# Patient Record
Sex: MALE | Race: WHITE | NOT HISPANIC OR LATINO | Employment: UNEMPLOYED | URBAN - METROPOLITAN AREA
[De-identification: names, ages, dates, MRNs, and addresses within clinical notes are randomized per-mention and may not be internally consistent; named-entity substitution may affect disease eponyms.]

---

## 2019-03-20 ENCOUNTER — HOSPITAL ENCOUNTER (OUTPATIENT)
Dept: RADIOLOGY | Facility: MEDICAL CENTER | Age: 54
End: 2019-03-20

## 2019-03-20 ENCOUNTER — APPOINTMENT (OUTPATIENT)
Dept: RADIOLOGY | Facility: MEDICAL CENTER | Age: 54
DRG: 280 | End: 2019-03-20
Attending: EMERGENCY MEDICINE
Payer: COMMERCIAL

## 2019-03-20 ENCOUNTER — HOSPITAL ENCOUNTER (INPATIENT)
Facility: MEDICAL CENTER | Age: 54
LOS: 6 days | DRG: 280 | End: 2019-03-26
Attending: EMERGENCY MEDICINE | Admitting: HOSPITALIST
Payer: COMMERCIAL

## 2019-03-20 DIAGNOSIS — I21.4 NSTEMI (NON-ST ELEVATED MYOCARDIAL INFARCTION) (HCC): ICD-10-CM

## 2019-03-20 PROBLEM — E11.65 TYPE 2 DIABETES MELLITUS WITH HYPERGLYCEMIA, WITHOUT LONG-TERM CURRENT USE OF INSULIN (HCC): Status: ACTIVE | Noted: 2019-03-20

## 2019-03-20 PROBLEM — F15.10 AMPHETAMINE ABUSE (HCC): Status: ACTIVE | Noted: 2019-03-20

## 2019-03-20 PROBLEM — E66.01 CLASS 3 SEVERE OBESITY DUE TO EXCESS CALORIES WITHOUT SERIOUS COMORBIDITY WITH BODY MASS INDEX (BMI) OF 40.0 TO 44.9 IN ADULT (HCC): Status: ACTIVE | Noted: 2019-03-20

## 2019-03-20 PROBLEM — E78.5 DYSLIPIDEMIA: Status: ACTIVE | Noted: 2019-03-20

## 2019-03-20 PROBLEM — I50.9 CHF (CONGESTIVE HEART FAILURE) (HCC): Status: ACTIVE | Noted: 2019-03-20

## 2019-03-20 PROBLEM — D64.9 NORMOCYTIC ANEMIA: Status: ACTIVE | Noted: 2019-03-20

## 2019-03-20 LAB
ALBUMIN SERPL BCP-MCNC: 2.9 G/DL (ref 3.2–4.9)
ALBUMIN/GLOB SERPL: 1 G/DL
ALP SERPL-CCNC: 74 U/L (ref 30–99)
ALT SERPL-CCNC: 19 U/L (ref 2–50)
ANION GAP SERPL CALC-SCNC: 8 MMOL/L (ref 0–11.9)
APTT PPP: 46.1 SEC (ref 24.7–36)
AST SERPL-CCNC: 25 U/L (ref 12–45)
BASOPHILS # BLD AUTO: 0.4 % (ref 0–1.8)
BASOPHILS # BLD: 0.06 K/UL (ref 0–0.12)
BILIRUB SERPL-MCNC: 0.6 MG/DL (ref 0.1–1.5)
BUN SERPL-MCNC: 22 MG/DL (ref 8–22)
CALCIUM SERPL-MCNC: 8.7 MG/DL (ref 8.5–10.5)
CHLORIDE SERPL-SCNC: 107 MMOL/L (ref 96–112)
CO2 SERPL-SCNC: 22 MMOL/L (ref 20–33)
CREAT SERPL-MCNC: 1.24 MG/DL (ref 0.5–1.4)
EKG IMPRESSION: NORMAL
EOSINOPHIL # BLD AUTO: 0.36 K/UL (ref 0–0.51)
EOSINOPHIL NFR BLD: 2.3 % (ref 0–6.9)
ERYTHROCYTE [DISTWIDTH] IN BLOOD BY AUTOMATED COUNT: 48.7 FL (ref 35.9–50)
GLOBULIN SER CALC-MCNC: 3 G/DL (ref 1.9–3.5)
GLUCOSE SERPL-MCNC: 169 MG/DL (ref 65–99)
HCT VFR BLD AUTO: 42.5 % (ref 42–52)
HGB BLD-MCNC: 13.8 G/DL (ref 14–18)
IMM GRANULOCYTES # BLD AUTO: 0.14 K/UL (ref 0–0.11)
IMM GRANULOCYTES NFR BLD AUTO: 0.9 % (ref 0–0.9)
INR PPP: 1.23 (ref 0.87–1.13)
LYMPHOCYTES # BLD AUTO: 2.1 K/UL (ref 1–4.8)
LYMPHOCYTES NFR BLD: 13.2 % (ref 22–41)
MCH RBC QN AUTO: 29.2 PG (ref 27–33)
MCHC RBC AUTO-ENTMCNC: 32.5 G/DL (ref 33.7–35.3)
MCV RBC AUTO: 89.9 FL (ref 81.4–97.8)
MONOCYTES # BLD AUTO: 1.66 K/UL (ref 0–0.85)
MONOCYTES NFR BLD AUTO: 10.5 % (ref 0–13.4)
NEUTROPHILS # BLD AUTO: 11.55 K/UL (ref 1.82–7.42)
NEUTROPHILS NFR BLD: 72.7 % (ref 44–72)
NRBC # BLD AUTO: 0 K/UL
NRBC BLD-RTO: 0 /100 WBC
PLATELET # BLD AUTO: 279 K/UL (ref 164–446)
PMV BLD AUTO: 9.7 FL (ref 9–12.9)
POTASSIUM SERPL-SCNC: 4.3 MMOL/L (ref 3.6–5.5)
PROT SERPL-MCNC: 5.9 G/DL (ref 6–8.2)
PROTHROMBIN TIME: 15.6 SEC (ref 12–14.6)
RBC # BLD AUTO: 4.73 M/UL (ref 4.7–6.1)
SODIUM SERPL-SCNC: 137 MMOL/L (ref 135–145)
TROPONIN I SERPL-MCNC: 10.98 NG/ML (ref 0–0.04)
WBC # BLD AUTO: 15.9 K/UL (ref 4.8–10.8)

## 2019-03-20 PROCEDURE — 770020 HCHG ROOM/CARE - TELE (206)

## 2019-03-20 PROCEDURE — 85025 COMPLETE CBC W/AUTO DIFF WBC: CPT

## 2019-03-20 PROCEDURE — 99223 1ST HOSP IP/OBS HIGH 75: CPT | Performed by: HOSPITALIST

## 2019-03-20 PROCEDURE — 700111 HCHG RX REV CODE 636 W/ 250 OVERRIDE (IP): Performed by: HOSPITALIST

## 2019-03-20 PROCEDURE — 80053 COMPREHEN METABOLIC PANEL: CPT

## 2019-03-20 PROCEDURE — 700111 HCHG RX REV CODE 636 W/ 250 OVERRIDE (IP): Performed by: EMERGENCY MEDICINE

## 2019-03-20 PROCEDURE — 85730 THROMBOPLASTIN TIME PARTIAL: CPT

## 2019-03-20 PROCEDURE — 93005 ELECTROCARDIOGRAM TRACING: CPT | Performed by: EMERGENCY MEDICINE

## 2019-03-20 PROCEDURE — 71045 X-RAY EXAM CHEST 1 VIEW: CPT

## 2019-03-20 PROCEDURE — 96366 THER/PROPH/DIAG IV INF ADDON: CPT

## 2019-03-20 PROCEDURE — 85610 PROTHROMBIN TIME: CPT

## 2019-03-20 PROCEDURE — 99255 IP/OBS CONSLTJ NEW/EST HI 80: CPT | Performed by: INTERNAL MEDICINE

## 2019-03-20 PROCEDURE — 94760 N-INVAS EAR/PLS OXIMETRY 1: CPT

## 2019-03-20 PROCEDURE — 96365 THER/PROPH/DIAG IV INF INIT: CPT

## 2019-03-20 PROCEDURE — 99285 EMERGENCY DEPT VISIT HI MDM: CPT

## 2019-03-20 PROCEDURE — 84484 ASSAY OF TROPONIN QUANT: CPT

## 2019-03-20 PROCEDURE — 304561 HCHG STAT O2

## 2019-03-20 PROCEDURE — 96375 TX/PRO/DX INJ NEW DRUG ADDON: CPT

## 2019-03-20 RX ORDER — PROMETHAZINE HYDROCHLORIDE 25 MG/1
12.5-25 TABLET ORAL EVERY 4 HOURS PRN
Status: DISCONTINUED | OUTPATIENT
Start: 2019-03-20 | End: 2019-03-21

## 2019-03-20 RX ORDER — ATORVASTATIN CALCIUM 20 MG/1
80 TABLET, FILM COATED ORAL NIGHTLY
Status: DISCONTINUED | OUTPATIENT
Start: 2019-03-21 | End: 2019-03-20

## 2019-03-20 RX ORDER — ONDANSETRON 2 MG/ML
4 INJECTION INTRAMUSCULAR; INTRAVENOUS EVERY 4 HOURS PRN
Status: DISCONTINUED | OUTPATIENT
Start: 2019-03-20 | End: 2019-03-26 | Stop reason: HOSPADM

## 2019-03-20 RX ORDER — LOSARTAN POTASSIUM 25 MG/1
25 TABLET ORAL DAILY
Status: ON HOLD | COMMUNITY
End: 2019-03-25

## 2019-03-20 RX ORDER — MORPHINE SULFATE 4 MG/ML
2-4 INJECTION, SOLUTION INTRAMUSCULAR; INTRAVENOUS
Status: DISCONTINUED | OUTPATIENT
Start: 2019-03-20 | End: 2019-03-26 | Stop reason: HOSPADM

## 2019-03-20 RX ORDER — PROMETHAZINE HYDROCHLORIDE 25 MG/1
12.5-25 SUPPOSITORY RECTAL EVERY 4 HOURS PRN
Status: DISCONTINUED | OUTPATIENT
Start: 2019-03-20 | End: 2019-03-21

## 2019-03-20 RX ORDER — POLYETHYLENE GLYCOL 3350 17 G/17G
1 POWDER, FOR SOLUTION ORAL
Status: DISCONTINUED | OUTPATIENT
Start: 2019-03-20 | End: 2019-03-21

## 2019-03-20 RX ORDER — DIGOXIN 125 MCG
250 TABLET ORAL DAILY
Status: DISCONTINUED | OUTPATIENT
Start: 2019-03-21 | End: 2019-03-23

## 2019-03-20 RX ORDER — DEXTROSE MONOHYDRATE 25 G/50ML
25 INJECTION, SOLUTION INTRAVENOUS
Status: DISCONTINUED | OUTPATIENT
Start: 2019-03-20 | End: 2019-03-24

## 2019-03-20 RX ORDER — ATORVASTATIN CALCIUM 20 MG/1
40 TABLET, FILM COATED ORAL NIGHTLY
Status: ON HOLD | COMMUNITY
End: 2019-03-25

## 2019-03-20 RX ORDER — ASPIRIN 300 MG/1
300 SUPPOSITORY RECTAL DAILY
Status: DISCONTINUED | OUTPATIENT
Start: 2019-03-21 | End: 2019-03-23

## 2019-03-20 RX ORDER — FUROSEMIDE 10 MG/ML
40 INJECTION INTRAMUSCULAR; INTRAVENOUS ONCE
Status: COMPLETED | OUTPATIENT
Start: 2019-03-20 | End: 2019-03-20

## 2019-03-20 RX ORDER — HEPARIN SODIUM 1000 [USP'U]/ML
3800 INJECTION, SOLUTION INTRAVENOUS; SUBCUTANEOUS PRN
Status: DISCONTINUED | OUTPATIENT
Start: 2019-03-20 | End: 2019-03-22

## 2019-03-20 RX ORDER — ATORVASTATIN CALCIUM 80 MG/1
80 TABLET, FILM COATED ORAL EVERY EVENING
Status: DISCONTINUED | OUTPATIENT
Start: 2019-03-21 | End: 2019-03-26 | Stop reason: HOSPADM

## 2019-03-20 RX ORDER — ASPIRIN 81 MG/1
324 TABLET, CHEWABLE ORAL DAILY
Status: DISCONTINUED | OUTPATIENT
Start: 2019-03-21 | End: 2019-03-23

## 2019-03-20 RX ORDER — NITROGLYCERIN 0.4 MG/1
0.4 TABLET SUBLINGUAL
Status: DISCONTINUED | OUTPATIENT
Start: 2019-03-20 | End: 2019-03-26 | Stop reason: HOSPADM

## 2019-03-20 RX ORDER — OXYCODONE HYDROCHLORIDE 5 MG/1
5 TABLET ORAL
Status: DISCONTINUED | OUTPATIENT
Start: 2019-03-20 | End: 2019-03-21

## 2019-03-20 RX ORDER — OXYCODONE HYDROCHLORIDE 5 MG/1
2.5 TABLET ORAL
Status: DISCONTINUED | OUTPATIENT
Start: 2019-03-20 | End: 2019-03-21

## 2019-03-20 RX ORDER — CLONIDINE HYDROCHLORIDE 0.1 MG/1
0.1 TABLET ORAL EVERY 6 HOURS PRN
Status: DISCONTINUED | OUTPATIENT
Start: 2019-03-20 | End: 2019-03-21

## 2019-03-20 RX ORDER — BISACODYL 10 MG
10 SUPPOSITORY, RECTAL RECTAL
Status: DISCONTINUED | OUTPATIENT
Start: 2019-03-20 | End: 2019-03-21

## 2019-03-20 RX ORDER — FUROSEMIDE 10 MG/ML
20 INJECTION INTRAMUSCULAR; INTRAVENOUS
Status: DISCONTINUED | OUTPATIENT
Start: 2019-03-21 | End: 2019-03-21

## 2019-03-20 RX ORDER — ONDANSETRON 4 MG/1
4 TABLET, ORALLY DISINTEGRATING ORAL EVERY 4 HOURS PRN
Status: DISCONTINUED | OUTPATIENT
Start: 2019-03-20 | End: 2019-03-26 | Stop reason: HOSPADM

## 2019-03-20 RX ORDER — MORPHINE SULFATE 4 MG/ML
2 INJECTION, SOLUTION INTRAMUSCULAR; INTRAVENOUS
Status: DISCONTINUED | OUTPATIENT
Start: 2019-03-20 | End: 2019-03-21

## 2019-03-20 RX ORDER — ASPIRIN 325 MG
325 TABLET ORAL DAILY
Status: DISCONTINUED | OUTPATIENT
Start: 2019-03-21 | End: 2019-03-23

## 2019-03-20 RX ORDER — LOSARTAN POTASSIUM 25 MG/1
25 TABLET ORAL DAILY
Status: DISCONTINUED | OUTPATIENT
Start: 2019-03-21 | End: 2019-03-22

## 2019-03-20 RX ORDER — AMOXICILLIN 250 MG
2 CAPSULE ORAL 2 TIMES DAILY
Status: DISCONTINUED | OUTPATIENT
Start: 2019-03-21 | End: 2019-03-21

## 2019-03-20 RX ORDER — FUROSEMIDE 20 MG/1
20 TABLET ORAL 2 TIMES DAILY
Status: ON HOLD | COMMUNITY
End: 2019-03-25

## 2019-03-20 RX ORDER — ACETAMINOPHEN 325 MG/1
650 TABLET ORAL EVERY 6 HOURS PRN
Status: DISCONTINUED | OUTPATIENT
Start: 2019-03-20 | End: 2019-03-26 | Stop reason: HOSPADM

## 2019-03-20 RX ADMIN — HEPARIN SODIUM 1450 UNITS/HR: 5000 INJECTION, SOLUTION INTRAVENOUS at 22:36

## 2019-03-20 RX ADMIN — FUROSEMIDE 40 MG: 10 INJECTION, SOLUTION INTRAMUSCULAR; INTRAVENOUS at 23:26

## 2019-03-20 RX ADMIN — HEPARIN SODIUM 3800 UNITS: 1000 INJECTION, SOLUTION INTRAVENOUS; SUBCUTANEOUS at 23:24

## 2019-03-20 ASSESSMENT — ENCOUNTER SYMPTOMS
MUSCULOSKELETAL NEGATIVE: 1
NEUROLOGICAL NEGATIVE: 1
EYES NEGATIVE: 1
RESPIRATORY NEGATIVE: 1
GASTROINTESTINAL NEGATIVE: 1
PSYCHIATRIC NEGATIVE: 1
CONSTITUTIONAL NEGATIVE: 1

## 2019-03-21 ENCOUNTER — APPOINTMENT (OUTPATIENT)
Dept: CARDIOLOGY | Facility: MEDICAL CENTER | Age: 54
DRG: 280 | End: 2019-03-21
Attending: HOSPITALIST
Payer: COMMERCIAL

## 2019-03-21 PROBLEM — I10 ESSENTIAL HYPERTENSION: Status: ACTIVE | Noted: 2019-03-21

## 2019-03-21 PROBLEM — D72.825 BANDEMIA: Status: ACTIVE | Noted: 2019-03-21

## 2019-03-21 PROBLEM — R05.9 COUGH: Status: ACTIVE | Noted: 2019-03-21

## 2019-03-21 LAB
AMPHET UR QL SCN: POSITIVE
ANION GAP SERPL CALC-SCNC: 5 MMOL/L (ref 0–11.9)
APTT PPP: 106 SEC (ref 24.7–36)
APTT PPP: 78.3 SEC (ref 24.7–36)
BARBITURATES UR QL SCN: NEGATIVE
BASOPHILS # BLD AUTO: 0.3 % (ref 0–1.8)
BASOPHILS # BLD: 0.05 K/UL (ref 0–0.12)
BENZODIAZ UR QL SCN: NEGATIVE
BUN SERPL-MCNC: 22 MG/DL (ref 8–22)
BZE UR QL SCN: NEGATIVE
CALCIUM SERPL-MCNC: 8.7 MG/DL (ref 8.5–10.5)
CANNABINOIDS UR QL SCN: NEGATIVE
CHLORIDE SERPL-SCNC: 101 MMOL/L (ref 96–112)
CO2 SERPL-SCNC: 27 MMOL/L (ref 20–33)
CREAT SERPL-MCNC: 1.34 MG/DL (ref 0.5–1.4)
CRP SERPL HS-MCNC: 8.51 MG/DL (ref 0–0.75)
EOSINOPHIL # BLD AUTO: 0.32 K/UL (ref 0–0.51)
EOSINOPHIL NFR BLD: 2.1 % (ref 0–6.9)
ERYTHROCYTE [DISTWIDTH] IN BLOOD BY AUTOMATED COUNT: 48.8 FL (ref 35.9–50)
ERYTHROCYTE [SEDIMENTATION RATE] IN BLOOD BY WESTERGREN METHOD: 46 MM/HOUR (ref 0–20)
GLUCOSE BLD-MCNC: 168 MG/DL (ref 65–99)
GLUCOSE BLD-MCNC: 190 MG/DL (ref 65–99)
GLUCOSE BLD-MCNC: 206 MG/DL (ref 65–99)
GLUCOSE BLD-MCNC: 222 MG/DL (ref 65–99)
GLUCOSE SERPL-MCNC: 254 MG/DL (ref 65–99)
HCT VFR BLD AUTO: 41 % (ref 42–52)
HGB BLD-MCNC: 13.1 G/DL (ref 14–18)
IMM GRANULOCYTES # BLD AUTO: 0.14 K/UL (ref 0–0.11)
IMM GRANULOCYTES NFR BLD AUTO: 0.9 % (ref 0–0.9)
LYMPHOCYTES # BLD AUTO: 1.99 K/UL (ref 1–4.8)
LYMPHOCYTES NFR BLD: 13.3 % (ref 22–41)
MCH RBC QN AUTO: 28.9 PG (ref 27–33)
MCHC RBC AUTO-ENTMCNC: 32 G/DL (ref 33.7–35.3)
MCV RBC AUTO: 90.5 FL (ref 81.4–97.8)
METHADONE UR QL SCN: NEGATIVE
MONOCYTES # BLD AUTO: 1.39 K/UL (ref 0–0.85)
MONOCYTES NFR BLD AUTO: 9.3 % (ref 0–13.4)
NEUTROPHILS # BLD AUTO: 11.06 K/UL (ref 1.82–7.42)
NEUTROPHILS NFR BLD: 74.1 % (ref 44–72)
NRBC # BLD AUTO: 0 K/UL
NRBC BLD-RTO: 0 /100 WBC
OPIATES UR QL SCN: POSITIVE
OXYCODONE UR QL SCN: NEGATIVE
PCP UR QL SCN: NEGATIVE
PLATELET # BLD AUTO: 244 K/UL (ref 164–446)
PMV BLD AUTO: 9.7 FL (ref 9–12.9)
POTASSIUM SERPL-SCNC: 4.7 MMOL/L (ref 3.6–5.5)
PROCALCITONIN SERPL-MCNC: <0.05 NG/ML
PROPOXYPH UR QL SCN: NEGATIVE
RBC # BLD AUTO: 4.53 M/UL (ref 4.7–6.1)
SODIUM SERPL-SCNC: 133 MMOL/L (ref 135–145)
TROPONIN I SERPL-MCNC: 10.18 NG/ML (ref 0–0.04)
WBC # BLD AUTO: 15 K/UL (ref 4.8–10.8)

## 2019-03-21 PROCEDURE — 83036 HEMOGLOBIN GLYCOSYLATED A1C: CPT

## 2019-03-21 PROCEDURE — 84484 ASSAY OF TROPONIN QUANT: CPT

## 2019-03-21 PROCEDURE — 700102 HCHG RX REV CODE 250 W/ 637 OVERRIDE(OP): Performed by: INTERNAL MEDICINE

## 2019-03-21 PROCEDURE — A9270 NON-COVERED ITEM OR SERVICE: HCPCS | Performed by: INTERNAL MEDICINE

## 2019-03-21 PROCEDURE — 700111 HCHG RX REV CODE 636 W/ 250 OVERRIDE (IP): Performed by: INTERNAL MEDICINE

## 2019-03-21 PROCEDURE — 85730 THROMBOPLASTIN TIME PARTIAL: CPT

## 2019-03-21 PROCEDURE — 82962 GLUCOSE BLOOD TEST: CPT | Mod: 91

## 2019-03-21 PROCEDURE — 700105 HCHG RX REV CODE 258

## 2019-03-21 PROCEDURE — 85652 RBC SED RATE AUTOMATED: CPT

## 2019-03-21 PROCEDURE — 700111 HCHG RX REV CODE 636 W/ 250 OVERRIDE (IP): Performed by: STUDENT IN AN ORGANIZED HEALTH CARE EDUCATION/TRAINING PROGRAM

## 2019-03-21 PROCEDURE — 700102 HCHG RX REV CODE 250 W/ 637 OVERRIDE(OP): Performed by: HOSPITALIST

## 2019-03-21 PROCEDURE — 700105 HCHG RX REV CODE 258: Performed by: INTERNAL MEDICINE

## 2019-03-21 PROCEDURE — 80307 DRUG TEST PRSMV CHEM ANLYZR: CPT

## 2019-03-21 PROCEDURE — 700111 HCHG RX REV CODE 636 W/ 250 OVERRIDE (IP): Performed by: HOSPITALIST

## 2019-03-21 PROCEDURE — 770020 HCHG ROOM/CARE - TELE (206)

## 2019-03-21 PROCEDURE — 80048 BASIC METABOLIC PNL TOTAL CA: CPT

## 2019-03-21 PROCEDURE — 87040 BLOOD CULTURE FOR BACTERIA: CPT

## 2019-03-21 PROCEDURE — 84145 PROCALCITONIN (PCT): CPT

## 2019-03-21 PROCEDURE — 36415 COLL VENOUS BLD VENIPUNCTURE: CPT

## 2019-03-21 PROCEDURE — 85025 COMPLETE CBC W/AUTO DIFF WBC: CPT

## 2019-03-21 PROCEDURE — 99232 SBSQ HOSP IP/OBS MODERATE 35: CPT | Mod: GC | Performed by: INTERNAL MEDICINE

## 2019-03-21 PROCEDURE — 86140 C-REACTIVE PROTEIN: CPT

## 2019-03-21 PROCEDURE — 99233 SBSQ HOSP IP/OBS HIGH 50: CPT | Performed by: INTERNAL MEDICINE

## 2019-03-21 PROCEDURE — A9270 NON-COVERED ITEM OR SERVICE: HCPCS | Performed by: HOSPITALIST

## 2019-03-21 RX ORDER — SODIUM CHLORIDE 9 MG/ML
INJECTION, SOLUTION INTRAVENOUS
Status: COMPLETED
Start: 2019-03-21 | End: 2019-03-21

## 2019-03-21 RX ORDER — FUROSEMIDE 10 MG/ML
40 INJECTION INTRAMUSCULAR; INTRAVENOUS
Status: DISCONTINUED | OUTPATIENT
Start: 2019-03-21 | End: 2019-03-25

## 2019-03-21 RX ORDER — POLYETHYLENE GLYCOL 3350 17 G/17G
1 POWDER, FOR SOLUTION ORAL 2 TIMES DAILY
Status: DISCONTINUED | OUTPATIENT
Start: 2019-03-21 | End: 2019-03-26 | Stop reason: HOSPADM

## 2019-03-21 RX ORDER — DOXYCYCLINE 100 MG/1
100 TABLET ORAL EVERY 12 HOURS
Status: DISCONTINUED | OUTPATIENT
Start: 2019-03-21 | End: 2019-03-22

## 2019-03-21 RX ADMIN — DOXYCYCLINE 100 MG: 100 TABLET, FILM COATED ORAL at 13:18

## 2019-03-21 RX ADMIN — HEPARIN SODIUM 1750 UNITS/HR: 5000 INJECTION, SOLUTION INTRAVENOUS at 13:14

## 2019-03-21 RX ADMIN — POLYETHYLENE GLYCOL 3350 1 PACKET: 17 POWDER, FOR SOLUTION ORAL at 17:44

## 2019-03-21 RX ADMIN — DIGOXIN 250 MCG: 125 TABLET ORAL at 00:53

## 2019-03-21 RX ADMIN — INSULIN HUMAN 2 UNITS: 100 INJECTION, SOLUTION PARENTERAL at 01:04

## 2019-03-21 RX ADMIN — AMPICILLIN SODIUM AND SULBACTAM SODIUM 3 G: 2; 1 INJECTION, POWDER, FOR SOLUTION INTRAMUSCULAR; INTRAVENOUS at 13:25

## 2019-03-21 RX ADMIN — CLONIDINE HYDROCHLORIDE 0.1 MG: 0.1 TABLET ORAL at 04:47

## 2019-03-21 RX ADMIN — ATORVASTATIN CALCIUM 80 MG: 80 TABLET, FILM COATED ORAL at 17:43

## 2019-03-21 RX ADMIN — LOSARTAN POTASSIUM 25 MG: 25 TABLET ORAL at 04:47

## 2019-03-21 RX ADMIN — INSULIN HUMAN 2 UNITS: 100 INJECTION, SOLUTION PARENTERAL at 06:12

## 2019-03-21 RX ADMIN — INSULIN HUMAN 1 UNITS: 100 INJECTION, SOLUTION PARENTERAL at 11:04

## 2019-03-21 RX ADMIN — INSULIN HUMAN 1 UNITS: 100 INJECTION, SOLUTION PARENTERAL at 17:00

## 2019-03-21 RX ADMIN — DIGOXIN 250 MCG: 125 TABLET ORAL at 17:43

## 2019-03-21 RX ADMIN — ASPIRIN 325 MG: 325 TABLET ORAL at 04:47

## 2019-03-21 RX ADMIN — AMPICILLIN SODIUM AND SULBACTAM SODIUM 3 G: 2; 1 INJECTION, POWDER, FOR SOLUTION INTRAMUSCULAR; INTRAVENOUS at 17:44

## 2019-03-21 RX ADMIN — FUROSEMIDE 20 MG: 10 INJECTION, SOLUTION INTRAMUSCULAR; INTRAVENOUS at 04:49

## 2019-03-21 RX ADMIN — SODIUM CHLORIDE 500 ML: 9 INJECTION, SOLUTION INTRAVENOUS at 13:19

## 2019-03-21 RX ADMIN — FUROSEMIDE 40 MG: 10 INJECTION, SOLUTION INTRAMUSCULAR; INTRAVENOUS at 16:15

## 2019-03-21 ASSESSMENT — ENCOUNTER SYMPTOMS
HEMOPTYSIS: 0
HEARTBURN: 0
WEAKNESS: 1
DOUBLE VISION: 0
FEVER: 0
MYALGIAS: 0
NECK PAIN: 0
ORTHOPNEA: 1
CONSTIPATION: 0
SPUTUM PRODUCTION: 0
SPEECH CHANGE: 0
SEIZURES: 0
DEPRESSION: 0
PND: 1
COUGH: 1
DIZZINESS: 0
FLANK PAIN: 0
FALLS: 0
BLOOD IN STOOL: 0
LOSS OF CONSCIOUSNESS: 0
HEADACHES: 0
CHILLS: 0
NAUSEA: 0
TINGLING: 0
VOMITING: 0
FOCAL WEAKNESS: 0
SHORTNESS OF BREATH: 1
DIARRHEA: 0
BLURRED VISION: 0
SENSORY CHANGE: 0
TREMORS: 0
CLAUDICATION: 0
BACK PAIN: 0
PALPITATIONS: 0
ABDOMINAL PAIN: 0
DIAPHORESIS: 0

## 2019-03-21 ASSESSMENT — LIFESTYLE VARIABLES
SUBSTANCE_ABUSE: 1
ALCOHOL_USE: NO

## 2019-03-21 ASSESSMENT — COGNITIVE AND FUNCTIONAL STATUS - GENERAL
MOBILITY SCORE: 22
SUGGESTED CMS G CODE MODIFIER MOBILITY: CJ
SUGGESTED CMS G CODE MODIFIER DAILY ACTIVITY: CH
DAILY ACTIVITIY SCORE: 24
CLIMB 3 TO 5 STEPS WITH RAILING: A LITTLE
WALKING IN HOSPITAL ROOM: A LITTLE

## 2019-03-21 ASSESSMENT — PATIENT HEALTH QUESTIONNAIRE - PHQ9
2. FEELING DOWN, DEPRESSED, IRRITABLE, OR HOPELESS: NOT AT ALL
1. LITTLE INTEREST OR PLEASURE IN DOING THINGS: NOT AT ALL
SUM OF ALL RESPONSES TO PHQ9 QUESTIONS 1 AND 2: 0

## 2019-03-21 NOTE — PROGRESS NOTES
[facility] Cardiology Follow-up Consult Note    Date of note:    3/21/2019      Consulting Physician: Stefani Helms M.D.      Chief Complaint   Patient presents with   • Chest Pain     Interfacility transfer from [facility] for NSTEMI       HPI:  52 yo male with PMHx of HTN, HLD, tobacco use presents with 1-2 weeks of worsening dyspnea, orthopnea, PND, central chest pressure and 30 lb weight gain. Admitted and found to have elevated trop, urine + for amphetamines and symptoms suggestive of acute heart failure. Patient was transferred to [facility] for further evaluation with cardiac catheterization. Due to respiratory status and volume overload he was felt to be unable to lie flat and thus tolerate cath. He was started on heparin drip with diuresis and plan for coronary angiogram once more stable.       Interim Events:  - symptoms improved with blood pressure control and diuresis, still feels SOB but better, currently chest pain free   - still with difficulty lying flat, plan for diuresis today and angiogram tomorrow, NPO at midnight   - echocardiogram pending       ROS  No NV, No Bleeding, No dizziness, + SOB, denies chest pain or palpitations, feels tired and fatigue, + weight gain   All other review of systems reviewed and negative.    Past medical, surgical, social, and family history reviewed and unchanged from admission except as noted in assessment and plan.    Medications: Reviewed in MAR  Current Facility-Administered Medications   Medication Dose Frequency Provider Last Rate Last Dose   • polyethylene glycol/lytes (MIRALAX) PACKET 1 Packet  1 Packet BID Stefani Helms M.D.       • ampicillin/sulbactam (UNASYN) 3 g in  mL IVPB  3 g Q6HRS Stefani Helms M.D. 200 mL/hr at 03/21/19 1325 3 g at 03/21/19 1325   • doxycycline monohydrate (ADOXA) tablet 100 mg  100 mg Q12HRS Stefani Helms M.D.   100 mg at 03/21/19 1318   • furosemide (LASIX) injection 40 mg  40 mg BID DIURETIC Magi Santos M.D.       • heparin injection  "3,800 Units  3,800 Units PRN Jean-Paul Dyson M.D.   3,800 Units at 03/20/19 2324    And   • heparin infusion 25,000 units in 500 ml 0.45% nacl   Continuous Jean-Paul Dyson M.D. 35 mL/hr at 03/21/19 1346 1,750 Units/hr at 03/21/19 1346   • acetaminophen (TYLENOL) tablet 650 mg  650 mg Q6HRS PRN Jean-Paul Dyson M.D.       • ondansetron (ZOFRAN) syringe/vial injection 4 mg  4 mg Q4HRS PRN Jean-Paul Dyson M.D.       • ondansetron (ZOFRAN ODT) dispertab 4 mg  4 mg Q4HRS PRN Jean-Paul Dyson M.D.       • morphine (pf) 4 mg/ml injection 2-4 mg  2-4 mg Q5 MIN PRN Jean-Paul Dyson M.D.       • nitroglycerin (NITROSTAT) tablet 0.4 mg  0.4 mg Q5 MIN PRN Jean-Paul Dyson M.D.       • atorvastatin (LIPITOR) tablet 80 mg  80 mg Q EVENING Jean-Paul Dyson M.D.       • aspirin (ASA) tablet 325 mg  325 mg DAILY Jean-Paul Dyson M.D.   325 mg at 03/21/19 0447    Or   • aspirin (ASA) chewable tab 324 mg  324 mg DAILY Jean-Paul Dyson M.D.        Or   • aspirin (ASA) suppository 300 mg  300 mg DAILY Jean-Paul Dyson M.D.       • losartan (COZAAR) tablet 25 mg  25 mg DAILY Jean-Paul Dyson M.D.   25 mg at 03/21/19 0447   • digoxin (LANOXIN) tablet 250 mcg  250 mcg DAILY AT 1800 Pantera Ritchie M.D.   250 mcg at 03/21/19 0053   • insulin regular (HUMULIN R) injection 1-6 Units  1-6 Units Q6HRS Jean-Paul Dyson M.D.   1 Units at 03/21/19 1104    And   • glucose 4 g chewable tablet 16 g  16 g Q15 MIN PRN Jean-Paul Dyson M.D.        And   • dextrose 50% (D50W) injection 25 mL  25 mL Q15 MIN PRN Jean-Paul Dyson M.D.         Last reviewed on 3/20/2019 11:07 PM by Sammie Smith PhT  No Known Allergies    Physical Exam  Body mass index is 40.83 kg/m². Blood pressure 146/95, pulse (!) 106, temperature 36.6 °C (97.9 °F), temperature source Temporal, resp. rate 18, height 1.727 m (5' 8\"), weight 121.8 kg (268 lb 8.3 oz), SpO2 97 %.   Vitals:    03/21/19 0024 03/21/19 0423 03/21/19 0915 03/21/19 1308   BP: 158/105 (!) 164/110 159/111 146/95   Pulse:  " (!) 104 99 (!) 106   Resp: (!) 22 20 20 18   Temp: 36.6 °C (97.8 °F) 36.3 °C (97.4 °F) 36.3 °C (97.3 °F) 36.6 °C (97.9 °F)   TempSrc: Temporal Temporal Temporal Temporal   SpO2:  98% 100% 97%   Weight: 121.8 kg (268 lb 8.3 oz)      Height:        Oxygen Therapy:  Pulse Oximetry: 97 %, O2 (LPM): 2, O2 Delivery: Silicone Nasal Cannula    General: non-toxic appearing male, sitting up in bed, sleepy but easily aroused, no acute distress   HEENT: NC AT, conjunctiva normal no, JVD   Lungs: bibasilar crackles, no rales or rhonchi   Heart: tachycardic, no murmurs, no rubs or gallops no S3   EXT: 2+ pitting edema bilaterally. + pedal pulses. no cyanosis  Abdomen: soft, non tender, non distended  Neurological: A/O x 3. No focal sensory deficits  Skin: Warm extremities, slightly diaphoretic     Labs (personally reviewed and notable for):   Recent Results (from the past 24 hour(s))   CBC WITH DIFFERENTIAL    Collection Time: 03/20/19 10:21 PM   Result Value Ref Range    WBC 15.9 (H) 4.8 - 10.8 K/uL    RBC 4.73 4.70 - 6.10 M/uL    Hemoglobin 13.8 (L) 14.0 - 18.0 g/dL    Hematocrit 42.5 42.0 - 52.0 %    MCV 89.9 81.4 - 97.8 fL    MCH 29.2 27.0 - 33.0 pg    MCHC 32.5 (L) 33.7 - 35.3 g/dL    RDW 48.7 35.9 - 50.0 fL    Platelet Count 279 164 - 446 K/uL    MPV 9.7 9.0 - 12.9 fL    Neutrophils-Polys 72.70 (H) 44.00 - 72.00 %    Lymphocytes 13.20 (L) 22.00 - 41.00 %    Monocytes 10.50 0.00 - 13.40 %    Eosinophils 2.30 0.00 - 6.90 %    Basophils 0.40 0.00 - 1.80 %    Immature Granulocytes 0.90 0.00 - 0.90 %    Nucleated RBC 0.00 /100 WBC    Neutrophils (Absolute) 11.55 (H) 1.82 - 7.42 K/uL    Lymphs (Absolute) 2.10 1.00 - 4.80 K/uL    Monos (Absolute) 1.66 (H) 0.00 - 0.85 K/uL    Eos (Absolute) 0.36 0.00 - 0.51 K/uL    Baso (Absolute) 0.06 0.00 - 0.12 K/uL    Immature Granulocytes (abs) 0.14 (H) 0.00 - 0.11 K/uL    NRBC (Absolute) 0.00 K/uL   CMP    Collection Time: 03/20/19 10:21 PM   Result Value Ref Range    Sodium 137 135 - 145  mmol/L    Potassium 4.3 3.6 - 5.5 mmol/L    Chloride 107 96 - 112 mmol/L    Co2 22 20 - 33 mmol/L    Anion Gap 8.0 0.0 - 11.9    Glucose 169 (H) 65 - 99 mg/dL    Bun 22 8 - 22 mg/dL    Creatinine 1.24 0.50 - 1.40 mg/dL    Calcium 8.7 8.5 - 10.5 mg/dL    AST(SGOT) 25 12 - 45 U/L    ALT(SGPT) 19 2 - 50 U/L    Alkaline Phosphatase 74 30 - 99 U/L    Total Bilirubin 0.6 0.1 - 1.5 mg/dL    Albumin 2.9 (L) 3.2 - 4.9 g/dL    Total Protein 5.9 (L) 6.0 - 8.2 g/dL    Globulin 3.0 1.9 - 3.5 g/dL    A-G Ratio 1.0 g/dL   ESTIMATED GFR    Collection Time: 19 10:21 PM   Result Value Ref Range    GFR If African American >60 >60 mL/min/1.73 m 2    GFR If Non African American >60 >60 mL/min/1.73 m 2   TROPONIN    Collection Time: 19 10:29 PM   Result Value Ref Range    Troponin I 10.98 (H) 0.00 - 0.04 ng/mL   PTT    Collection Time: 19 10:29 PM   Result Value Ref Range    APTT 46.1 (H) 24.7 - 36.0 sec   PT/INR    Collection Time: 19 10:29 PM   Result Value Ref Range    PT 15.6 (H) 12.0 - 14.6 sec    INR 1.23 (H) 0.87 - 1.13   EKG    Collection Time: 19 10:35 PM   Result Value Ref Range    Report       [facility] Emergency Dept.    Test Date:  2019  Pt Name:    TONYA FERNANDEZ                 Department: ER  MRN:        [id number]                      Room:       RD 04  Gender:     Male                         Technician: [id number]  :        1965                   Requested By:CRISTY BOYER  Order #:    [id number]                    Reading MD: CRISTY BOYER MD    Measurements  Intervals                                Axis  Rate:       101                          P:          47  CO:         168                          QRS:        2  QRSD:       86                           T:          25  QT:         324  QTc:        420    Interpretive Statements  SINUS TACHYCARDIA  VENTRICULAR PREMATURE COMPLEX  LOW VOLTAGE IN FRONTAL LEADS  BORDERLINE T WAVE ABNORMALITIES  No previous  ECG available for comparison    Electronically Signed On 3- 22:42:37 PDT by CRISTY BOYER MD     ACCU-CHEK GLUCOSE    Collection Time: 03/21/19 12:52 AM   Result Value Ref Range    Glucose - Accu-Ck 222 (H) 65 - 99 mg/dL   URINE DRUG SCREEN    Collection Time: 03/21/19  1:30 AM   Result Value Ref Range    Amphetamines Urine Positive (A) Negative    Barbiturates Negative Negative    Benzodiazepines Negative Negative    Cocaine Metabolite Negative Negative    Methadone Negative Negative    Opiates Positive (A) Negative    Oxycodone Negative Negative    Phencyclidine -Pcp Negative Negative    Propoxyphene Negative Negative    Cannabinoid Metab Negative Negative   Troponin in four (4) hours    Collection Time: 03/21/19  3:46 AM   Result Value Ref Range    Troponin I 10.18 (H) 0.00 - 0.04 ng/mL   Basic Metabolic Panel (BMP)    Collection Time: 03/21/19  3:46 AM   Result Value Ref Range    Sodium 133 (L) 135 - 145 mmol/L    Potassium 4.7 3.6 - 5.5 mmol/L    Chloride 101 96 - 112 mmol/L    Co2 27 20 - 33 mmol/L    Glucose 254 (H) 65 - 99 mg/dL    Bun 22 8 - 22 mg/dL    Creatinine 1.34 0.50 - 1.40 mg/dL    Calcium 8.7 8.5 - 10.5 mg/dL    Anion Gap 5.0 0.0 - 11.9   CBC with Differential    Collection Time: 03/21/19  3:46 AM   Result Value Ref Range    WBC 15.0 (H) 4.8 - 10.8 K/uL    RBC 4.53 (L) 4.70 - 6.10 M/uL    Hemoglobin 13.1 (L) 14.0 - 18.0 g/dL    Hematocrit 41.0 (L) 42.0 - 52.0 %    MCV 90.5 81.4 - 97.8 fL    MCH 28.9 27.0 - 33.0 pg    MCHC 32.0 (L) 33.7 - 35.3 g/dL    RDW 48.8 35.9 - 50.0 fL    Platelet Count 244 164 - 446 K/uL    MPV 9.7 9.0 - 12.9 fL    Neutrophils-Polys 74.10 (H) 44.00 - 72.00 %    Lymphocytes 13.30 (L) 22.00 - 41.00 %    Monocytes 9.30 0.00 - 13.40 %    Eosinophils 2.10 0.00 - 6.90 %    Basophils 0.30 0.00 - 1.80 %    Immature Granulocytes 0.90 0.00 - 0.90 %    Nucleated RBC 0.00 /100 WBC    Neutrophils (Absolute) 11.06 (H) 1.82 - 7.42 K/uL    Lymphs (Absolute) 1.99 1.00 - 4.80 K/uL     Monos (Absolute) 1.39 (H) 0.00 - 0.85 K/uL    Eos (Absolute) 0.32 0.00 - 0.51 K/uL    Baso (Absolute) 0.05 0.00 - 0.12 K/uL    Immature Granulocytes (abs) 0.14 (H) 0.00 - 0.11 K/uL    NRBC (Absolute) 0.00 K/uL   ESTIMATED GFR    Collection Time: 03/21/19  3:46 AM   Result Value Ref Range    GFR If African American >60 >60 mL/min/1.73 m 2    GFR If Non  56 (A) >60 mL/min/1.73 m 2   APTT    Collection Time: 03/21/19  5:57 AM   Result Value Ref Range    APTT 106.0 (HH) 24.7 - 36.0 sec   ACCU-CHEK GLUCOSE    Collection Time: 03/21/19  6:11 AM   Result Value Ref Range    Glucose - Accu-Ck 206 (H) 65 - 99 mg/dL   ACCU-CHEK GLUCOSE    Collection Time: 03/21/19 11:01 AM   Result Value Ref Range    Glucose - Accu-Ck 168 (H) 65 - 99 mg/dL   APTT    Collection Time: 03/21/19 12:01 PM   Result Value Ref Range    APTT 78.3 (H) 24.7 - 36.0 sec   WESTERGREN SED RATE    Collection Time: 03/21/19  1:27 PM   Result Value Ref Range    Sed Rate Westergren 46 (H) 0 - 20 mm/hour       Cardiac Imaging and Procedures Review:    EKG and telemetry tracings personally reviewed    EKG- normal sinus rhythm, diffuse T wave changes no depression or elevation       ASSESSMENT & PLAN    53-year-old male, suspect new onset heart failure with reduced ejection fraction and NSTEMI possible substance induced given + amphetamines in urine.  We will aggressively diurese and plan for cardiac cath when respiratory status is improved      1. NSTEMI  - possibly drug induced vs CAD   - continue aspirin and heparin drip   - continue statin   - plan for cardiac cath tomorrow   - NPO at midnight      2. Likely CHFrEF  - echo pending   - continue losartain 25, digoxin 0.25, hold beta blocker due to acute decompensated HF  - aggressive diuresis with 40 mg IV BID      3. T2DM  - glucose elevated  - hold metformin   - repeat Ha1c pending   - management per primary team       4. HTN  - stable, continue losartan and lasix, hold BB for now      5.  "HLD  - lipid panel pending   - cont. Statin     6. Substance use   - + urine for amphetamines, pt denies use, admits to hx of using   \"speed\"  - educate on importance of substance use cessation      7. Tobacco use   - educate on importance of smoking cessation     8. Cough   - possible infectious source given leukocytosis  - management per primary team     It is my pleasure to participate in the care of Mr. Hyatt.  Please do not hesitate to contact me with questions or concerns.    Magi Santos  PGY 2 [employer] [facility]   "

## 2019-03-21 NOTE — HEART FAILURE PROGRAM
Heart Failure and NSTEMI diagnoses, seen by Dr. Ritchie in consult last night. Echocardiogram is pending. Felt by Dr. Ritchie to have LVD, diuresing. Will need ischemic evaluation when respiratory status improves.  Currently in T838.    Lives in [place], no insurance listed. I've ordered SW to assess this.    Please see below for all of the Defect Free Care Measures for both of these diagnoses.    HF Measures:  1. Documentation of LV systolic function (echo or cath) PTA, during this hospitalization, or plan to assess post discharge or reason for not assessing documented.  2. ACE-I, ARNI or ARB prescribed on discharge for LVEF <40%  3. For HF patients with LVEF less than or equal to 40% evidence based beta blocker must be prescribed upon discharge one of the following: carvedilol, bisoprolol, Toprol XL  4. For EF less than or equal to 35% aldosterone blockade prescribed upon discharge  5. Nutrition consult for diet education  6. HF education documented daily  7. Screening for and administering immunizations as long as no contraindications: Pneumonia and Influenza  8. Written discharge instructions include:  ? Daily weights  ? Record weight on tracker  ? Bring tracker to appointments  ? Call MD for weight gain of 3lb /day or 5lb/week  ? HF medication teaching  ? Low sodium diet  ? Follow up appointment within seven calendar days of d/c must include: date, time and location  ? Activity  ? Worsening symptoms  What if any of the above HF measures are contraindicated?  ? Request that the discharging provider document the medication/intervention and the contraindication specifically in a progress note  ? For example: “no CHF meds due to hypotension” is not enough. It needs to say: “No ACE-I, ARNI, ARB due to hypotension”; “No Beta Blockade due to bradycardia”…         ACS Measures:  1. ASA prescribed at discharge  2. Beta blockade prescribed at discharge, if patient also has HFrEF (EF less than or equal to 40%), this needs  to be one of the three evidence based beta blockers: carvedilol, bisoprolol, Toprol XL  3. High intensity statin prescribed at discharge (atorvastatin 40 mg or rosuvastatin 20 mg)  4. ACE-I or ARB prescribed on discharge for LVEF less than 40%  5. Aldosterone blockade prescribed for patients with EF less than 40% AND history of diabetes mellitus OR history of heart failure, heart failure on presentation or heart failure as an in-hospital event  6. ICR referral order is placed  7. Use the Acute Coronary Syndrome discharge instructions to document that patient has been provided with the contact information for ICR   8. Evaluation of LV systolic function can be by angiogram, or echo before discharge, or within past year, cannot be a future plan for LVSF assessment  9. ACS education is documented daily  1. For anyone who has had PCI, initiate Meds to Beds: Monday through Friday 9-5 call [phone]. All other times, call [phone] and ask to page the on-call anticoag pharmacist.  10. Smoking cessation counseling    What if any of the above ACS Measures are contraindicated?  ? Request that the discharging provider document the medication/intervention and the contraindication specifically in a progress note  ? For example: “no ACE-I meds due to hypotension” is not enough. It needs to say: “No ACE-I, ARNI, ARB due to hypotension”; “No Beta Blockade due to bradycardia”…

## 2019-03-21 NOTE — PROGRESS NOTES
Report received. Patient A&O x4. No complaints of pain.  VSS, /111, patient stated he felt fine and no S/Sx noted, will monitor BP throughout the day. POC discussed, patient verbalized understanding. Belongings and bedside table within reach. Bed in low and locked positions. Fall precautions in place. Patient educated to call for assistance. No other needs at this time.

## 2019-03-21 NOTE — ASSESSMENT & PLAN NOTE
EF 40%  Maintain IV diuresis with Lasix  Monitoring renal function, electrolytes and I/os, daily weights with diuresis  Continue losartan  Carvedilol  Consider Aldactone over the coming days  We will continue close clinical monitoring

## 2019-03-21 NOTE — ASSESSMENT & PLAN NOTE
Coronary angiogram revealing coronary artery disease, no intervention done, medical management recommended    Echocardiogram with depressed EF of 40% and pulmonary hypertension    Aspirin 81, Plavix 75, high intensity statin therapy, carvedilol, ARB, Aldactone, Isordil, hydralazine

## 2019-03-21 NOTE — CONSULTS
Cardiology Consult Note:    Pantera Ritchie  Date & Time note created:    3/20/2019   11:52 PM     Referring MD:  Dr. Perry    Patient ID:   Name:             Fracisco Hyatt   YOB: 1965  Age:                 53 y.o.  male   MRN:               [id number]                                                             Reason for Consult:      nSTEMI    History of Present Illness:    53-year-old male with a past medical history of obesity tobacco abuse hypertension hyperlipidemia presents tonight with progressive shortness of breath PND lower extremity edema and no chest pain.  The chest pain described as a pressure-like sensation in his mid chest not worsened by exertion and not relieved by rest.  He was seen in [facility] in the hospital and was getting troponins in the next one came back positive at less than 1.  He was heparinized and transferred to [facility] for further care.  Prior to transfer he received 20 mg of IV Lasix due to an elevated BNP and evidence of shortness of breath.  Here in the ER he continued to show evidence of shortness of breath with pulmonary infiltrates.  He was given 40 mg of IV Lasix.  A bedside portable echocardiogram was attempted but was nondiagnostic secondary to body habitus.  His next troponin came back positive at 10.  He is currently chest pain-free.    Review of Systems:      Constitutional: Denies fevers, Denies weight changes  Eyes: Denies changes in vision, no eye pain  Ears/Nose/Throat/Mouth: Denies nasal congestion or sore throat   Cardiovascular: + chest pain, n palpitations   Respiratory: + shortness of breath , Denies cough  Gastrointestinal/Hepatic: Denies abdominal pain, nausea, vomiting, diarrhea, constipation or GI bleeding   Genitourinary: Denies dysuria or frequency  Musculoskeletal/Rheum: Denies  joint pain and swelling, + edema  Skin: Denies rash  Neurological: Denies headache, confusion, memory loss or focal weakness/parasthesias  Psychiatric: denies mood  disorder   Endocrine: Miracle thyroid problems  Heme/Oncology/Lymph Nodes: Denies enlarged lymph nodes, denies brusing or known bleeding disorder  All other systems were reviewed and are negative (AMA/CMS criteria)                Past Medical History:   Past Medical History:   Diagnosis Date   • Congestive heart failure ([facility])    • Diabetes ([facility])    • Hypertension      Active Hospital Problems    Diagnosis   • NSTEMI (non-ST elevated myocardial infarction) ([facility]) [I21.4]   • Amphetamine abuse ([facility]) [F15.10]   • Normocytic anemia [D64.9]   • Class 3 severe obesity due to excess calories without serious comorbidity with body mass index (BMI) of 40.0 to 44.9 in adult ([facility]) [E66.01, Z68.41]   • Type 2 diabetes mellitus with hyperglycemia, without long-term current use of insulin ([facility]) [E11.65]   • CHF (congestive heart failure) ([facility]) [I50.9]       Past Surgical History:  History reviewed. No pertinent surgical history.    Hospital Medications:  Current Facility-Administered Medications   Medication Dose   • heparin injection 3,800 Units  3,800 Units    And   • heparin infusion 25,000 units in 500 ml 0.45% nacl     • acetaminophen (TYLENOL) tablet 650 mg  650 mg   • Pharmacy Consult Request ...Pain Management Review 1 Each  1 Each    And   • oxyCODONE immediate-release (ROXICODONE) tablet 2.5 mg  2.5 mg    And   • oxyCODONE immediate-release (ROXICODONE) tablet 5 mg  5 mg    And   • morphine (pf) 4 mg/ml injection 2 mg  2 mg   • cloNIDine (CATAPRES) tablet 0.1 mg  0.1 mg   • ondansetron (ZOFRAN) syringe/vial injection 4 mg  4 mg   • ondansetron (ZOFRAN ODT) dispertab 4 mg  4 mg   • promethazine (PHENERGAN) tablet 12.5-25 mg  12.5-25 mg   • promethazine (PHENERGAN) suppository 12.5-25 mg  12.5-25 mg   • prochlorperazine (COMPAZINE) injection 5-10 mg  5-10 mg   • [START ON 3/21/2019] senna-docusate (PERICOLACE or SENOKOT S) 8.6-50 MG per tablet 2 Tab  2 Tab    And   • polyethylene glycol/lytes (MIRALAX) PACKET 1 Packet  1  "Packet    And   • magnesium hydroxide (MILK OF MAGNESIA) suspension 30 mL  30 mL    And   • bisacodyl (DULCOLAX) suppository 10 mg  10 mg   • morphine (pf) 4 mg/ml injection 2-4 mg  2-4 mg   • nitroglycerin (NITROSTAT) tablet 0.4 mg  0.4 mg   • [START ON 3/21/2019] metoprolol (LOPRESSOR) tablet 25 mg  25 mg   • [START ON 3/21/2019] atorvastatin (LIPITOR) tablet 80 mg  80 mg   • [START ON 3/21/2019] aspirin (ASA) tablet 325 mg  325 mg    Or   • [START ON 3/21/2019] aspirin (ASA) chewable tab 324 mg  324 mg    Or   • [START ON 3/21/2019] aspirin (ASA) suppository 300 mg  300 mg   • [START ON 3/21/2019] atorvastatin (LIPITOR) tablet 80 mg  80 mg   • [START ON 3/21/2019] losartan (COZAAR) tablet 25 mg  25 mg     Current Outpatient Prescriptions   Medication   • metFORMIN (GLUCOPHAGE) 500 MG Tab   • atorvastatin (LIPITOR) 20 MG Tab   • losartan (COZAAR) 25 MG Tab   • furosemide (LASIX) 20 MG Tab         Current Outpatient Medications:    (Not in a hospital admission)    Medication Allergy:  No Known Allergies    Family History:  Family History   Problem Relation Age of Onset   • No Known Problems Mother    • Alcohol/Drug Father        Social History:  Social History     Social History   • Marital status: N/A     Spouse name: N/A   • Number of children: N/A   • Years of education: N/A     Occupational History   • Not on file.     Social History Main Topics   • Smoking status: Current Every Day Smoker     Packs/day: 0.50     Years: 40.00     Types: Cigarettes   • Smokeless tobacco: Never Used   • Alcohol use No   • Drug use: No      Comment: methamphetamines    • Sexual activity: Not on file     Other Topics Concern   • Not on file     Social History Narrative   • No narrative on file         Physical Exam:  Vitals/ General Appearance:   Weight/BMI: Body mass index is 40.96 kg/m².  Blood pressure 128/98, pulse (!) 106, temperature 36.1 °C (96.9 °F), temperature source Temporal, resp. rate 20, height 1.727 m (5' 8\"), weight " "122.2 kg (269 lb 6.4 oz), SpO2 96 %.  Vitals:    03/20/19 2222 03/20/19 2223   BP:  128/98   Pulse:  (!) 106   Resp:  20   Temp:  36.1 °C (96.9 °F)   TempSrc:  Temporal   SpO2:  96%   Weight:  122.2 kg (269 lb 6.4 oz)   Height: 1.727 m (5' 8\")      Oxygen Therapy:  Pulse Oximetry: 96 %, O2 (LPM): 2, O2 Delivery: Silicone Nasal Cannula    Constitutional:   Well developed, Well nourished, No acute distress  HENMT:  Normocephalic, Atraumatic, Oropharynx moist mucous membranes, No oral exudates, Nose normal.  No thyromegaly.  Eyes:  EOMI, Conjunctiva normal, No discharge.  Neck:  Normal range of motion, No cervical tenderness,  no JVD.  Cardiovascular:  Normal heart rate, Normal rhythm, No murmurs, No rubs, No gallops.   Extremitites with intact distal pulses, no cyanosis, or edema.  Lungs:  Normal breath sounds, breath sounds clear to auscultation bilaterally,  no crackles, no wheezing.   Abdomen: Bowel sounds normal, Soft, No tenderness, No guarding, No rebound, No masses, No hepatosplenomegaly.  Skin: Warm, Dry, No erythema, No rash, no induration.  Neurologic: Alert & oriented x 3, No focal deficits noted, cranial nerves II through X are grossly intact.  Psychiatric: Affect normal, Judgment normal, Mood normal.      MDM (Data Review):     Records reviewed and summarized in current documentation    Lab Data Review:  Recent Results (from the past 24 hour(s))   CBC WITH DIFFERENTIAL    Collection Time: 03/20/19 10:21 PM   Result Value Ref Range    WBC 15.9 (H) 4.8 - 10.8 K/uL    RBC 4.73 4.70 - 6.10 M/uL    Hemoglobin 13.8 (L) 14.0 - 18.0 g/dL    Hematocrit 42.5 42.0 - 52.0 %    MCV 89.9 81.4 - 97.8 fL    MCH 29.2 27.0 - 33.0 pg    MCHC 32.5 (L) 33.7 - 35.3 g/dL    RDW 48.7 35.9 - 50.0 fL    Platelet Count 279 164 - 446 K/uL    MPV 9.7 9.0 - 12.9 fL    Neutrophils-Polys 72.70 (H) 44.00 - 72.00 %    Lymphocytes 13.20 (L) 22.00 - 41.00 %    Monocytes 10.50 0.00 - 13.40 %    Eosinophils 2.30 0.00 - 6.90 %    Basophils " 0.40 0.00 - 1.80 %    Immature Granulocytes 0.90 0.00 - 0.90 %    Nucleated RBC 0.00 /100 WBC    Neutrophils (Absolute) 11.55 (H) 1.82 - 7.42 K/uL    Lymphs (Absolute) 2.10 1.00 - 4.80 K/uL    Monos (Absolute) 1.66 (H) 0.00 - 0.85 K/uL    Eos (Absolute) 0.36 0.00 - 0.51 K/uL    Baso (Absolute) 0.06 0.00 - 0.12 K/uL    Immature Granulocytes (abs) 0.14 (H) 0.00 - 0.11 K/uL    NRBC (Absolute) 0.00 K/uL   CMP    Collection Time: 19 10:21 PM   Result Value Ref Range    Sodium 137 135 - 145 mmol/L    Potassium 4.3 3.6 - 5.5 mmol/L    Chloride 107 96 - 112 mmol/L    Co2 22 20 - 33 mmol/L    Anion Gap 8.0 0.0 - 11.9    Glucose 169 (H) 65 - 99 mg/dL    Bun 22 8 - 22 mg/dL    Creatinine 1.24 0.50 - 1.40 mg/dL    Calcium 8.7 8.5 - 10.5 mg/dL    AST(SGOT) 25 12 - 45 U/L    ALT(SGPT) 19 2 - 50 U/L    Alkaline Phosphatase 74 30 - 99 U/L    Total Bilirubin 0.6 0.1 - 1.5 mg/dL    Albumin 2.9 (L) 3.2 - 4.9 g/dL    Total Protein 5.9 (L) 6.0 - 8.2 g/dL    Globulin 3.0 1.9 - 3.5 g/dL    A-G Ratio 1.0 g/dL   ESTIMATED GFR    Collection Time: 19 10:21 PM   Result Value Ref Range    GFR If African American >60 >60 mL/min/1.73 m 2    GFR If Non African American >60 >60 mL/min/1.73 m 2   TROPONIN    Collection Time: 19 10:29 PM   Result Value Ref Range    Troponin I 10.98 (H) 0.00 - 0.04 ng/mL   PTT    Collection Time: 19 10:29 PM   Result Value Ref Range    APTT 46.1 (H) 24.7 - 36.0 sec   PT/INR    Collection Time: 19 10:29 PM   Result Value Ref Range    PT 15.6 (H) 12.0 - 14.6 sec    INR 1.23 (H) 0.87 - 1.13   EKG    Collection Time: 19 10:35 PM   Result Value Ref Range    Report       [facility]    Test Date:  2019  Pt Name:    TONYA FERNANDEZ                 Department: ER  MRN:        [id number]                      Room:       [facility]  Gender:     Male                         Technician: [id number]  :        1965                   Requested By:CRISTY GEORGES  MERLIN  Order #:    [id number]                    Reading MD: CRISTY BOYER MD    Measurements  Intervals                                Axis  Rate:       101                          P:          47  WV:         168                          QRS:        2  QRSD:       86                           T:          25  QT:         324  QTc:        420    Interpretive Statements  SINUS TACHYCARDIA  VENTRICULAR PREMATURE COMPLEX  LOW VOLTAGE IN FRONTAL LEADS  BORDERLINE T WAVE ABNORMALITIES  No previous ECG available for comparison    Electronically Signed On 3- 22:42:37 PDT by CRISTY BOYER MD         Imaging/Procedures Review:    Chest Xray:  Reviewed    EKG:   Personally viewed by myself showing normal sinus rhythm with nonspecific ST segment changes.    ECHO:  Pending    MDM (Assessment and Plan):     Active Hospital Problems    Diagnosis   • NSTEMI (non-ST elevated myocardial infarction) ([facility]) [I21.4]   • Amphetamine abuse ([facility]) [F15.10]   • Normocytic anemia [D64.9]   • Class 3 severe obesity due to excess calories without serious comorbidity with body mass index (BMI) of 40.0 to 44.9 in adult ([facility]) [E66.01, Z68.41]   • Type 2 diabetes mellitus with hyperglycemia, without long-term current use of insulin ([facility]) [E11.65]   • CHF (congestive heart failure) ([facility]) [I50.9]     53-year-old male with new onset heart failure with reduced ejection fraction which is currently not known secondary to poorly visualized LV function but likely reduced.  We will give him Lasix IV to get him breathing better.  We will start him on losartan.  We will hold beta-blockers.  I will add on digoxin.  He will need a cath eventually however he is currently not respiratory stable for cardiac Cath Lab and is currently chest pain-free.    1. NSTEMI    - asa    - heparin drip    - atorva 80    2. CHFrEF    - cont losartan 25    - start dig 0.25     - hold beta blocker    - consider spironolactone, holding due to mild Cr increase,  baseline unclear    - lasix 40 IV BID    3. T2DM    - defer    4. HTN    - per above    5. HLD    - per above    6. Tob Abuse    - Tobacco cessation    Thank for you allowing me to take part in your patient's care, please call should you have any questions or would like to discuss this patient.

## 2019-03-21 NOTE — PROGRESS NOTES
2 RN skin check completed with Izabella RN. Abrasion on the right shin, otherwise skin grossly intact.

## 2019-03-21 NOTE — ASSESSMENT & PLAN NOTE
Mild, no evidence of acute bleeding  Recommend further evaluation and management per PCP in the outpatient setting

## 2019-03-21 NOTE — ASSESSMENT & PLAN NOTE
This is secondary to pulmonary edema, cardiogenic  No infectious concerns apparent  Stop antibiotics

## 2019-03-21 NOTE — ED TRIAGE NOTES
Pt transferred from [facility] the [facility] for C/O NSTEMI. Pt went to local ER for chest pain 10/10. Pt denies chest pain at this time.

## 2019-03-21 NOTE — ED NOTES
Med rec updated and complete.  Allergies reviewed.  Pt is not currently taking medications. reports not taking  Any medications for > 1 month. Pt is unsure of the names of medications or the strengths.  No oral antibiotic use in last 30 days at home.

## 2019-03-21 NOTE — ASSESSMENT & PLAN NOTE
Discussed with patient on March 21, 2019  Discussed his positive urine toxicology  Patient in agreement, ongoing methamphetamine use prior to presentation  Reports that he smokes methamphetamine  Last used 2-3 days prior to presentation  Intermittently has persisted to use methamphetamines  Patient has been counseled and educated regarding cessation of drugs  Adverse effects of methamphetamines including cardiac discussed extensively with the patient  Benefits of cessation discussed with the patient

## 2019-03-21 NOTE — PROGRESS NOTES
Hospital Medicine Daily Progress Note    Date of Service  3/21/2019    Chief Complaint  53 y.o. male admitted 3/20/2019 with chest pain    Hospital Course    Patient resented with chest pain to the emergency department on March 20, 2019.  Underlying history of type 2 diabetes mellitus, methamphetamine abuse, nicotine dependence, nonmedical compliance.  He was found to have   NSTEMI, cardiology consulted      Interval Problem Update  Patient seen and evaluated on rounds  Discussed with nursing staff on rounds  Today he still having some shortness of breath  Today he still denying any chest pain  Otherwise complaining of a cough, this is dry, nonproductive  No fever or chills  Fatigue and lethargy  Ongoing methamphetamine abuse prior to presentation  Ongoing nicotine dependence prior to presentation  Underlying morbid obesity  Denies any history of pulmonary problems  Some orthopnea, paroxysmal nocturnal dyspnea and persistent lower extremity edema at this time  No other complaints at this time  Cardiology team is following  Plans for coronary angiogram in place    Consultants/Specialty  Cardiology    Code Status  Full code    Disposition  Continue telemetry monitoring  Anticipate discharge home once acute issues have resolved    Review of Systems  Review of Systems   Constitutional: Positive for malaise/fatigue. Negative for chills, diaphoresis and fever.   HENT: Negative for hearing loss.    Eyes: Negative for blurred vision and double vision.   Respiratory: Positive for cough and shortness of breath. Negative for hemoptysis and sputum production.    Cardiovascular: Positive for orthopnea, leg swelling and PND. Negative for chest pain, palpitations and claudication.   Gastrointestinal: Negative for abdominal pain, blood in stool, constipation, diarrhea, heartburn, melena, nausea and vomiting.   Genitourinary: Positive for frequency (Lasix related). Negative for dysuria, flank pain, hematuria and urgency.    Musculoskeletal: Negative for back pain, falls, joint pain, myalgias and neck pain.   Skin: Negative for itching and rash.   Neurological: Positive for weakness. Negative for dizziness, tingling, tremors, sensory change, speech change, focal weakness, seizures, loss of consciousness and headaches.   Psychiatric/Behavioral: Positive for substance abuse. Negative for depression and suicidal ideas.        Physical Exam  Temp:  [36.1 °C (96.9 °F)-36.6 °C (97.8 °F)] 36.3 °C (97.3 °F)  Pulse:  [] 99  Resp:  [17-22] 20  BP: (128-164)/() 159/111  SpO2:  [94 %-100 %] 100 %    Physical Exam   Constitutional: He is oriented to person, place, and time. He appears well-developed and well-nourished. No distress.   Body mass index is 40.83 kg/m².   HENT:   Head: Normocephalic.   Mouth/Throat: Oropharynx is clear and moist. No oropharyngeal exudate.   Eyes: Pupils are equal, round, and reactive to light. Conjunctivae and EOM are normal. No scleral icterus.   Neck: Normal range of motion. JVD present. No thyromegaly present.   Cardiovascular: Normal rate and regular rhythm.    Murmur heard.  Pulmonary/Chest: No stridor. No respiratory distress. He has no wheezes. He has rales.   Abdominal: Soft. Bowel sounds are normal. He exhibits no distension. There is no tenderness. There is no rebound and no guarding.   Musculoskeletal: He exhibits edema. He exhibits no tenderness or deformity.   Lymphadenopathy:     He has no cervical adenopathy.   Neurological: He is alert and oriented to person, place, and time. He has normal reflexes. No cranial nerve deficit.   Skin: Skin is warm and dry. He is not diaphoretic.   Psychiatric: He has a normal mood and affect. His behavior is normal. Judgment normal.       Fluids    Intake/Output Summary (Last 24 hours) at 03/21/19 1305  Last data filed at 03/21/19 0741   Gross per 24 hour   Intake              360 ml   Output             2700 ml   Net            -2340 ml        Laboratory  Recent Labs      03/20/19 2221 03/21/19   0346   WBC  15.9*  15.0*   RBC  4.73  4.53*   HEMOGLOBIN  13.8*  13.1*   HEMATOCRIT  42.5  41.0*   MCV  89.9  90.5   MCH  29.2  28.9   MCHC  32.5*  32.0*   RDW  48.7  48.8   PLATELETCT  279  244   MPV  9.7  9.7     Recent Labs      03/20/19 2221 03/21/19   0346   SODIUM  137  133*   POTASSIUM  4.3  4.7   CHLORIDE  107  101   CO2  22  27   GLUCOSE  169*  254*   BUN  22  22   CREATININE  1.24  1.34   CALCIUM  8.7  8.7     Recent Labs      03/20/19 2229 03/21/19   0557  03/21/19   1201   APTT  46.1*  106.0*  78.3*   INR  1.23*   --    --                Imaging  DX-CHEST-PORTABLE (1 VIEW)   Final Result         1.  Pulmonary edema and/or infiltrates.   2.  Cardiomegaly      OUTSIDE IMAGES-CT CHEST   Final Result      OUTSIDE IMAGES-DX CHEST   Final Result      EC-ECHOCARDIOGRAM COMPLETE W/O CONT    (Results Pending)   CL-LEFT HEART CATHETERIZATION WITH POSSIBLE INTERVENTION    (Results Pending)        Assessment/Plan  NSTEMI (non-ST elevated myocardial infarction) (HCC)- (present on admission)   Assessment & Plan    Continue aspirin 325, high intensity statin therapy, ARB  Beta-blocker per cardiology team, unable to rule out decompensated congestive heart failure at this time  Continue IV heparin, I am monitoring this patient's APTT, adverse effects related to anticoagulation  Daily CBC, BMP  Obtaining echocardiogram, pending at this time  Plan for coronary angiography per cardiology team  This may be secondary to underlying congestive heart failure, methamphetamine abuse     Cough- (present on admission)   Assessment & Plan    This could be secondary to pulmonary edema versus infectious concerns  We will check blood cultures, ESR, CRP, pro calcitonin  Repeat pro calcitonin tomorrow  Interval BNP, chest x-ray tomorrow  For now IV Unasyn and doxycycline  We will de-escalate antibiotics tomorrow if not clinically indicated     Bandemia- (present on  admission)   Assessment & Plan    Stress related or infectious, plan of care as noted in cough below     Acute congestive heart failure (HCC)- (present on admission)   Assessment & Plan    Presumed systolic, awaiting formal results of echocardiogram at this time  Maintain IV Lasix, 20 mg IV twice daily  Monitoring renal function, electrolytes and I/os, daily weights with diuresis  Continue losartan  Initiation of beta-blocker therapy once okay per cardiology team  Consider Aldactone over the coming days  We will continue close clinical monitoring  Evaluation for ischemic etiologies per cardiology team     Essential hypertension- (present on admission)   Assessment & Plan    Continue afterload reduction     Dyslipidemia- (present on admission)   Assessment & Plan    Continue atorvastatin, check lipid profile     Type 2 diabetes mellitus with hyperglycemia, without long-term current use of insulin (HCC)- (present on admission)   Assessment & Plan    Holding oral hypoglycemic agents  Sliding scale insulin No. 2  Check hemoglobin A1c and lipid profile     Class 3 severe obesity due to excess calories without serious comorbidity with body mass index (BMI) of 40.0 to 44.9 in adult (HCC)- (present on admission)   Assessment & Plan    Body mass index is 40.96 kg/m².     Normocytic anemia- (present on admission)   Assessment & Plan    Mild, no evidence of acute bleeding  Recommend further evaluation and management per PCP in the outpatient setting     Amphetamine abuse (HCC)- (present on admission)   Assessment & Plan    Discussed with patient on March 21, 2019  Discussed his positive urine toxicology  Patient in agreement, ongoing methamphetamine use prior to presentation  Reports that he smokes methamphetamine  Last used 2-3 days prior to presentation  Intermittently has persisted to use methamphetamines  Patient has been counseled and educated regarding cessation of drugs  Adverse effects of methamphetamines including  cardiac discussed extensively with the patient  Benefits of cessation discussed with the patient          VTE prophylaxis: IV Heparin

## 2019-03-21 NOTE — H&P
Hospital Medicine History & Physical Note    Date of Service  3/20/2019    Primary Care Physician  No primary care provider on file.    Consultants  Cardiology Dr. Ritchie    Code Status  Full code     Chief Complaint  Chest pain    History of Presenting Illness  53 y.o. male with history of diabetes mellitus on oral medication regimen with hyperglycemia, tobacco use ongoing, and essential hypertension with poor control due to two weeks of medication non-compliance was in his usual state of health until around noon on the day of admission, when while smoking he had the sudden onset of pressure like pain in the center of his chest, constant without exacerbating factors, non-radiating.  This brought him to a local emergency department where he had findings consistent with acute heart failure as well as NSTEMI.  He was subsequently transferred to this facility for higher level of care, need of Cardiology.      Review of Systems  Review of Systems   Constitutional: Negative.    HENT: Negative.    Eyes: Negative.    Respiratory: Negative.    Cardiovascular: Positive for chest pain.   Gastrointestinal: Negative.    Genitourinary: Negative.    Musculoskeletal: Negative.    Skin: Negative.    Neurological: Negative.    Endo/Heme/Allergies: Negative.    Psychiatric/Behavioral: Negative.        Past Medical History   has a past medical history of Congestive heart failure (HCC); Diabetes (HCC); and Hypertension.    Surgical History   has no past surgical history on file.     Family History  family history includes Alcohol/Drug in his father; No Known Problems in his mother.     Social History   reports that he has been smoking Cigarettes.  He has a 20.00 pack-year smoking history. He has never used smokeless tobacco. He reports that he does not drink alcohol or use drugs.    Allergies  No Known Allergies    Medications  Prior to Admission Medications   Prescriptions Last Dose Informant Patient Reported? Taking?   atorvastatin  (LIPITOR) 20 MG Tab   Yes Yes   Sig: Take 40 mg by mouth every evening.   furosemide (LASIX) 20 MG Tab   Yes Yes   Sig: Take 20 mg by mouth 2 times a day.   losartan (COZAAR) 25 MG Tab   Yes Yes   Sig: Take 25 mg by mouth every day.   metFORMIN (GLUCOPHAGE) 500 MG Tab   Yes Yes   Sig: Take 1,000 mg by mouth 2 times a day, with meals.      Facility-Administered Medications: None       Physical Exam  Temp:  [36.1 °C (96.9 °F)] 36.1 °C (96.9 °F)  Pulse:  [106] 106  Resp:  [20] 20  BP: (128)/(98) 128/98  SpO2:  [96 %] 96 %    Physical Exam   Constitutional: He is oriented to person, place, and time. He appears well-developed and well-nourished. No distress.   HENT:   Head: Normocephalic and atraumatic.   Eyes: Pupils are equal, round, and reactive to light. Conjunctivae are normal.   Neck: Normal range of motion. Neck supple. No tracheal deviation present. No thyromegaly present.   Cardiovascular: Normal rate, regular rhythm and normal heart sounds.  Exam reveals no gallop and no friction rub.    No murmur heard.  Pulmonary/Chest: Effort normal and breath sounds normal. No respiratory distress. He has no wheezes.   Abdominal: Soft. Bowel sounds are normal. He exhibits no distension and no mass. There is no tenderness. There is no rebound and no guarding.   Musculoskeletal: Normal range of motion. He exhibits no edema.   Lymphadenopathy:     He has no cervical adenopathy.   Neurological: He is alert and oriented to person, place, and time. No cranial nerve deficit.   Skin: Skin is warm and dry. He is not diaphoretic.   Psychiatric: He has a normal mood and affect.   Nursing note and vitals reviewed.      Laboratory:  Recent Labs      03/20/19   2221   WBC  15.9*   RBC  4.73   HEMOGLOBIN  13.8*   HEMATOCRIT  42.5   MCV  89.9   MCH  29.2   MCHC  32.5*   RDW  48.7   PLATELETCT  279   MPV  9.7     Recent Labs      03/20/19   2221   SODIUM  137   POTASSIUM  4.3   CHLORIDE  107   CO2  22   GLUCOSE  169*   BUN  22   CREATININE   1.24   CALCIUM  8.7     Recent Labs      03/20/19   2221   ALTSGPT  19   ASTSGOT  25   ALKPHOSPHAT  74   TBILIRUBIN  0.6   GLUCOSE  169*     Recent Labs      03/20/19 2229   APTT  46.1*   INR  1.23*             Recent Labs      03/20/19 2229   TROPONINI  10.98*       Urinalysis:    No results found     Imaging:  DX-CHEST-PORTABLE (1 VIEW)   Final Result         1.  Pulmonary edema and/or infiltrates.   2.  Cardiomegaly      OUTSIDE IMAGES-CT CHEST   Final Result      OUTSIDE IMAGES-DX CHEST   Final Result      EC-ECHOCARDIOGRAM COMPLETE W/O CONT    (Results Pending)     CTA chest with no evident pulmonary embolism, but with evidence of pulmonary edema.     Assessment/Plan:  I anticipate this patient will require at least two midnights for appropriate medical management, necessitating inpatient admission.    Dyslipidemia   Assessment & Plan    Previously on statin therapy, non-compliant for 2 weeks.  Start 80mg daily dose atorvastatin.      CHF (congestive heart failure) ([facility])   Assessment & Plan    Unclear ejection fraction, bedside ultrasound difficult to determine.  Plan for diuresis as needed, treatment for NSTEMI     Type 2 diabetes mellitus with hyperglycemia, without long-term current use of insulin ([facility])   Assessment & Plan    Hold metformin therapy in favor of correction dose insulin therapy.      Class 3 severe obesity due to excess calories without serious comorbidity with body mass index (BMI) of 40.0 to 44.9 in adult ([facility])   Assessment & Plan    Body mass index is 40.96 kg/m².       Normocytic anemia   Assessment & Plan    No current evident bleed. Transfuse if needed for hemoglobin less than 7 gm/dL       Amphetamine abuse ([facility])   Assessment & Plan    States last used 2 years prior, raising specter for associated cardiomyopathy as underlying cause.  Check urine drug screen.      NSTEMI (non-ST elevated myocardial infarction) ([facility])   Assessment & Plan    Statin, heparin infusion, and cardiology  consultation.  NPO for possible angiogram.           VTE prophylaxis: SCD, heparin infusion

## 2019-03-21 NOTE — PROGRESS NOTES
Patient transported from ED to  T838. Bedside report given by FELICIA Miller. Patient's chart and MAR reviewed. Pt denies chest pain / generalized pain at this time. Pt is A & O x4. Patient was updated on plan of care for the day. Questions answered and concerns addressed.  Pt denies any additional needs at this time. White board updated. Call light, phone and personal belongings within reach.

## 2019-03-21 NOTE — ED PROVIDER NOTES
ED Provider Note    HPI: Patient is a 53-year-old male who presented to the emergency department March 20, 2019    Review of Systems: Positive for chest pain and shortness of breath which have resolved, leg swelling over the last couple of weeks, nasal congestion negative for fever chills cough nausea vomiting abdominal pain.  Review of systems reviewed with patient, all other systems negative    Past medical/surgical history: Hypertension CHF diabetes    Medications:     Allergies: None    Social History: Patient smokes 1 pack of cigarettes per day no alcohol use      Physical exam: Constitutional: Pleasant male awake alert appears comfortable  Vital signs: Temperature 96.9 blood pressure 128/98 pulse 106 respirations 20 pulse oximetry 96% on 2 L  EYES: PERRL, EOMI, Conjunctivae and sclera normal, eyelids normal bilaterally.  Neck: Trachea midline. No cervical masses seen or palpated. Normal range of motion, supple. No meningeal signs elicited.  Cardiac: Regular rate and rhythm. S1-S2 present.  S4 present. No murmurs, rubs, or gallops heard.  3+ pitting edema both lower extremities below the knees.  The patient reports this is relatively new.   Lungs: Coarse crackles diffusely aeration good. No wheezes, rales, or rhonchi heard. Patient's chest wall moved symmetrically with each respiratory effort. Patient was not making use of accessory muscles of respiration in breathing.  Abdomen: Soft nontender to palpation. No rebound or guarding elicited. No organomegaly identified. No pulsatile abdominal masses identified.   Musculoskeletal:  no  pain with palpitation or movement of muscle, bone or joint , no obvious musculoskeletal deformities identified.  Neurologic: alert and awake answers questions appropriately. Moves all four extremities independently, no gross focal abnormalities identified. Normal strength and motor.  Skin: no rash or lesion seen, no palpable dermatologic lesions identified.  Psychiatric: not anxious,  delusional, or hallucinating.    Medical decision making: EKG obtained on arrival (interpretation) twelve-lead EKG sinus tachycardia rate 101.  Morphology P waves QRS complexes unremarkable.  Patient had diffusely flattened T waves in essentially all leads but there was no evidence of ST elevation or depression.  Interpreted as an abnormal EKG but not indicating STEMI.    Heparin drip continued at my direction    I reviewed the studies from the other facility.  CT scan of the chest showed no evidence of pulmonary embolism or aortic abnormality.  EKG from the other facility showed no evidence of ST elevation or depression.  Laboratory significant for a white count of 14.8 and a markedly elevated BNP of 993 hyperglycemia blood sugar 320 elevated d-dimer of 634- influenza test.  Initial troponin was normal but a repeat troponin was 0.13 elevated concerning for cardiac ischemia    Chest x-ray obtained here; pulmonary edema is noted    Patient given an additional dose of IV Lasix.    Laboratory studies obtained (please see lab sheet for all results) significant findings included count of 15.9 which I suspect is reactive.  Chemistry panel showed moderate hyperglycemia blood sugar 169.  Troponin markedly elevated at 10.98.  Coagulation studies showed minimal elevation in INR of 1.23    Patient admitted by hospitalist service.  Cardiology has been contacted and will see the patient in house.  Patient is continued on heparin.  He is now pain-free but given his elevation in troponin I certainly consider him to be critically ill.  He has a significant likelihood of decompensation.  His signs and symptoms would not seem to go along with either aortic disease or pulmonary embolism and there is no evidence of either of those entities on CT imaging obtained at the other facility    Further care and hospital course per attending physician summary    Please note that I spent 34 minutes of the direct care of this critically ill  patient.  This time was spent in evaluating the patient, reviewing test results, discussion with consultants, and preparing the medical record.    Impression acute myocardial infarction non-STEMI  2) critical care greater than 30 minutes

## 2019-03-22 ENCOUNTER — PATIENT OUTREACH (OUTPATIENT)
Dept: HEALTH INFORMATION MANAGEMENT | Facility: OTHER | Age: 54
End: 2019-03-22

## 2019-03-22 ENCOUNTER — APPOINTMENT (OUTPATIENT)
Dept: CARDIOLOGY | Facility: MEDICAL CENTER | Age: 54
DRG: 280 | End: 2019-03-22
Attending: STUDENT IN AN ORGANIZED HEALTH CARE EDUCATION/TRAINING PROGRAM
Payer: COMMERCIAL

## 2019-03-22 ENCOUNTER — APPOINTMENT (OUTPATIENT)
Dept: RADIOLOGY | Facility: MEDICAL CENTER | Age: 54
DRG: 280 | End: 2019-03-22
Attending: INTERNAL MEDICINE
Payer: COMMERCIAL

## 2019-03-22 ENCOUNTER — APPOINTMENT (OUTPATIENT)
Dept: CARDIOLOGY | Facility: MEDICAL CENTER | Age: 54
DRG: 280 | End: 2019-03-22
Attending: HOSPITALIST
Payer: COMMERCIAL

## 2019-03-22 PROBLEM — I50.21 ACUTE SYSTOLIC CONGESTIVE HEART FAILURE (HCC): Status: ACTIVE | Noted: 2019-03-20

## 2019-03-22 LAB
ALBUMIN SERPL BCP-MCNC: 2.6 G/DL (ref 3.2–4.9)
ALBUMIN/GLOB SERPL: 0.9 G/DL
ALP SERPL-CCNC: 62 U/L (ref 30–99)
ALT SERPL-CCNC: 21 U/L (ref 2–50)
ANION GAP SERPL CALC-SCNC: 8 MMOL/L (ref 0–11.9)
APTT PPP: 118 SEC (ref 24.7–36)
APTT PPP: 97.6 SEC (ref 24.7–36)
AST SERPL-CCNC: 30 U/L (ref 12–45)
BASOPHILS # BLD AUTO: 0.8 % (ref 0–1.8)
BASOPHILS # BLD: 0.09 K/UL (ref 0–0.12)
BILIRUB SERPL-MCNC: 0.6 MG/DL (ref 0.1–1.5)
BNP SERPL-MCNC: 527 PG/ML (ref 0–100)
BUN SERPL-MCNC: 21 MG/DL (ref 8–22)
CALCIUM SERPL-MCNC: 8.5 MG/DL (ref 8.5–10.5)
CHLORIDE SERPL-SCNC: 106 MMOL/L (ref 96–112)
CHOLEST SERPL-MCNC: 113 MG/DL (ref 100–199)
CO2 SERPL-SCNC: 27 MMOL/L (ref 20–33)
CREAT SERPL-MCNC: 1.29 MG/DL (ref 0.5–1.4)
EKG IMPRESSION: NORMAL
EOSINOPHIL # BLD AUTO: 0.57 K/UL (ref 0–0.51)
EOSINOPHIL NFR BLD: 5 % (ref 0–6.9)
ERYTHROCYTE [DISTWIDTH] IN BLOOD BY AUTOMATED COUNT: 49.1 FL (ref 35.9–50)
EST. AVERAGE GLUCOSE BLD GHB EST-MCNC: 177 MG/DL
GLOBULIN SER CALC-MCNC: 2.9 G/DL (ref 1.9–3.5)
GLUCOSE BLD-MCNC: 160 MG/DL (ref 65–99)
GLUCOSE BLD-MCNC: 175 MG/DL (ref 65–99)
GLUCOSE BLD-MCNC: 231 MG/DL (ref 65–99)
GLUCOSE BLD-MCNC: 235 MG/DL (ref 65–99)
GLUCOSE SERPL-MCNC: 178 MG/DL (ref 65–99)
HBA1C MFR BLD: 7.8 % (ref 0–5.6)
HCT VFR BLD AUTO: 42.3 % (ref 42–52)
HDLC SERPL-MCNC: 26 MG/DL
HGB BLD-MCNC: 13.2 G/DL (ref 14–18)
IMM GRANULOCYTES # BLD AUTO: 0.11 K/UL (ref 0–0.11)
IMM GRANULOCYTES NFR BLD AUTO: 1 % (ref 0–0.9)
INR PPP: 1.2 (ref 0.87–1.13)
LDLC SERPL CALC-MCNC: 72 MG/DL
LV EJECT FRACT  99904: 40
LV EJECT FRACT MOD 2C 99903: 37.85
LV EJECT FRACT MOD 4C 99902: 42.27
LV EJECT FRACT MOD BP 99901: 41.33
LYMPHOCYTES # BLD AUTO: 2.01 K/UL (ref 1–4.8)
LYMPHOCYTES NFR BLD: 17.6 % (ref 22–41)
MAGNESIUM SERPL-MCNC: 1.6 MG/DL (ref 1.5–2.5)
MCH RBC QN AUTO: 28.7 PG (ref 27–33)
MCHC RBC AUTO-ENTMCNC: 31.2 G/DL (ref 33.7–35.3)
MCV RBC AUTO: 92 FL (ref 81.4–97.8)
MONOCYTES # BLD AUTO: 1.13 K/UL (ref 0–0.85)
MONOCYTES NFR BLD AUTO: 9.9 % (ref 0–13.4)
NEUTROPHILS # BLD AUTO: 7.49 K/UL (ref 1.82–7.42)
NEUTROPHILS NFR BLD: 65.7 % (ref 44–72)
NRBC # BLD AUTO: 0 K/UL
NRBC BLD-RTO: 0 /100 WBC
PHOSPHATE SERPL-MCNC: 4.3 MG/DL (ref 2.5–4.5)
PLATELET # BLD AUTO: 245 K/UL (ref 164–446)
PMV BLD AUTO: 9.4 FL (ref 9–12.9)
POTASSIUM SERPL-SCNC: 4.4 MMOL/L (ref 3.6–5.5)
PROCALCITONIN SERPL-MCNC: 0.06 NG/ML
PROT SERPL-MCNC: 5.5 G/DL (ref 6–8.2)
PROTHROMBIN TIME: 15.3 SEC (ref 12–14.6)
RBC # BLD AUTO: 4.6 M/UL (ref 4.7–6.1)
SODIUM SERPL-SCNC: 141 MMOL/L (ref 135–145)
TRIGL SERPL-MCNC: 74 MG/DL (ref 0–149)
TSH SERPL DL<=0.005 MIU/L-ACNC: 0.61 UIU/ML (ref 0.38–5.33)
WBC # BLD AUTO: 11.4 K/UL (ref 4.8–10.8)

## 2019-03-22 PROCEDURE — 84100 ASSAY OF PHOSPHORUS: CPT

## 2019-03-22 PROCEDURE — 770020 HCHG ROOM/CARE - TELE (206)

## 2019-03-22 PROCEDURE — 85025 COMPLETE CBC W/AUTO DIFF WBC: CPT

## 2019-03-22 PROCEDURE — 85730 THROMBOPLASTIN TIME PARTIAL: CPT

## 2019-03-22 PROCEDURE — 700102 HCHG RX REV CODE 250 W/ 637 OVERRIDE(OP): Performed by: INTERNAL MEDICINE

## 2019-03-22 PROCEDURE — A9270 NON-COVERED ITEM OR SERVICE: HCPCS | Performed by: INTERNAL MEDICINE

## 2019-03-22 PROCEDURE — 71045 X-RAY EXAM CHEST 1 VIEW: CPT

## 2019-03-22 PROCEDURE — 84145 PROCALCITONIN (PCT): CPT

## 2019-03-22 PROCEDURE — 80053 COMPREHEN METABOLIC PANEL: CPT

## 2019-03-22 PROCEDURE — 700101 HCHG RX REV CODE 250

## 2019-03-22 PROCEDURE — 700105 HCHG RX REV CODE 258: Performed by: INTERNAL MEDICINE

## 2019-03-22 PROCEDURE — 99233 SBSQ HOSP IP/OBS HIGH 50: CPT | Performed by: INTERNAL MEDICINE

## 2019-03-22 PROCEDURE — 82962 GLUCOSE BLOOD TEST: CPT | Mod: 91

## 2019-03-22 PROCEDURE — 93010 ELECTROCARDIOGRAM REPORT: CPT | Performed by: INTERNAL MEDICINE

## 2019-03-22 PROCEDURE — 84443 ASSAY THYROID STIM HORMONE: CPT

## 2019-03-22 PROCEDURE — 93005 ELECTROCARDIOGRAM TRACING: CPT | Performed by: INTERNAL MEDICINE

## 2019-03-22 PROCEDURE — 99152 MOD SED SAME PHYS/QHP 5/>YRS: CPT | Performed by: INTERNAL MEDICINE

## 2019-03-22 PROCEDURE — 83735 ASSAY OF MAGNESIUM: CPT

## 2019-03-22 PROCEDURE — 700117 HCHG RX CONTRAST REV CODE 255: Performed by: INTERNAL MEDICINE

## 2019-03-22 PROCEDURE — 93306 TTE W/DOPPLER COMPLETE: CPT | Mod: 26 | Performed by: INTERNAL MEDICINE

## 2019-03-22 PROCEDURE — 700117 HCHG RX CONTRAST REV CODE 255: Performed by: STUDENT IN AN ORGANIZED HEALTH CARE EDUCATION/TRAINING PROGRAM

## 2019-03-22 PROCEDURE — 700102 HCHG RX REV CODE 250 W/ 637 OVERRIDE(OP): Performed by: STUDENT IN AN ORGANIZED HEALTH CARE EDUCATION/TRAINING PROGRAM

## 2019-03-22 PROCEDURE — 99153 MOD SED SAME PHYS/QHP EA: CPT

## 2019-03-22 PROCEDURE — 700111 HCHG RX REV CODE 636 W/ 250 OVERRIDE (IP): Performed by: STUDENT IN AN ORGANIZED HEALTH CARE EDUCATION/TRAINING PROGRAM

## 2019-03-22 PROCEDURE — 700102 HCHG RX REV CODE 250 W/ 637 OVERRIDE(OP): Performed by: HOSPITALIST

## 2019-03-22 PROCEDURE — 700111 HCHG RX REV CODE 636 W/ 250 OVERRIDE (IP)

## 2019-03-22 PROCEDURE — 93458 L HRT ARTERY/VENTRICLE ANGIO: CPT | Mod: 26 | Performed by: INTERNAL MEDICINE

## 2019-03-22 PROCEDURE — 36415 COLL VENOUS BLD VENIPUNCTURE: CPT

## 2019-03-22 PROCEDURE — 700111 HCHG RX REV CODE 636 W/ 250 OVERRIDE (IP): Performed by: HOSPITALIST

## 2019-03-22 PROCEDURE — 85610 PROTHROMBIN TIME: CPT

## 2019-03-22 PROCEDURE — A9270 NON-COVERED ITEM OR SERVICE: HCPCS | Performed by: STUDENT IN AN ORGANIZED HEALTH CARE EDUCATION/TRAINING PROGRAM

## 2019-03-22 PROCEDURE — 83880 ASSAY OF NATRIURETIC PEPTIDE: CPT

## 2019-03-22 PROCEDURE — A9270 NON-COVERED ITEM OR SERVICE: HCPCS | Performed by: HOSPITALIST

## 2019-03-22 PROCEDURE — 99232 SBSQ HOSP IP/OBS MODERATE 35: CPT | Mod: GC | Performed by: INTERNAL MEDICINE

## 2019-03-22 PROCEDURE — 93306 TTE W/DOPPLER COMPLETE: CPT

## 2019-03-22 PROCEDURE — 700111 HCHG RX REV CODE 636 W/ 250 OVERRIDE (IP): Performed by: INTERNAL MEDICINE

## 2019-03-22 PROCEDURE — 80061 LIPID PANEL: CPT

## 2019-03-22 RX ORDER — CLONIDINE HYDROCHLORIDE 0.1 MG/1
0.1 TABLET ORAL 3 TIMES DAILY PRN
Status: DISCONTINUED | OUTPATIENT
Start: 2019-03-22 | End: 2019-03-26 | Stop reason: HOSPADM

## 2019-03-22 RX ORDER — SODIUM CHLORIDE 9 MG/ML
INJECTION, SOLUTION INTRAVENOUS CONTINUOUS
Status: DISPENSED | OUTPATIENT
Start: 2019-03-22 | End: 2019-03-23

## 2019-03-22 RX ORDER — MIDAZOLAM HYDROCHLORIDE 1 MG/ML
INJECTION INTRAMUSCULAR; INTRAVENOUS
Status: COMPLETED
Start: 2019-03-22 | End: 2019-03-22

## 2019-03-22 RX ORDER — CARVEDILOL 6.25 MG/1
6.25 TABLET ORAL 2 TIMES DAILY WITH MEALS
Status: DISCONTINUED | OUTPATIENT
Start: 2019-03-22 | End: 2019-03-23

## 2019-03-22 RX ORDER — HEPARIN SODIUM,PORCINE 1000/ML
VIAL (ML) INJECTION
Status: COMPLETED
Start: 2019-03-22 | End: 2019-03-22

## 2019-03-22 RX ORDER — LOSARTAN POTASSIUM 50 MG/1
50 TABLET ORAL DAILY
Status: DISCONTINUED | OUTPATIENT
Start: 2019-03-23 | End: 2019-03-23

## 2019-03-22 RX ORDER — LOSARTAN POTASSIUM 25 MG/1
25 TABLET ORAL ONCE
Status: COMPLETED | OUTPATIENT
Start: 2019-03-22 | End: 2019-03-22

## 2019-03-22 RX ORDER — VERAPAMIL HYDROCHLORIDE 2.5 MG/ML
INJECTION, SOLUTION INTRAVENOUS
Status: COMPLETED
Start: 2019-03-22 | End: 2019-03-22

## 2019-03-22 RX ORDER — AMLODIPINE BESYLATE 5 MG/1
5 TABLET ORAL
Status: DISCONTINUED | OUTPATIENT
Start: 2019-03-22 | End: 2019-03-23

## 2019-03-22 RX ORDER — LIDOCAINE HYDROCHLORIDE 20 MG/ML
INJECTION, SOLUTION INFILTRATION; PERINEURAL
Status: COMPLETED
Start: 2019-03-22 | End: 2019-03-22

## 2019-03-22 RX ADMIN — POLYETHYLENE GLYCOL 3350 1 PACKET: 17 POWDER, FOR SOLUTION ORAL at 05:28

## 2019-03-22 RX ADMIN — HEPARIN SODIUM 1750 UNITS/HR: 5000 INJECTION, SOLUTION INTRAVENOUS at 02:30

## 2019-03-22 RX ADMIN — LOSARTAN POTASSIUM 25 MG: 25 TABLET ORAL at 08:57

## 2019-03-22 RX ADMIN — FENTANYL CITRATE 100 MCG: 50 INJECTION INTRAMUSCULAR; INTRAVENOUS at 17:06

## 2019-03-22 RX ADMIN — HEPARIN SODIUM 2000 UNITS: 1000 INJECTION, SOLUTION INTRAVENOUS; SUBCUTANEOUS at 16:37

## 2019-03-22 RX ADMIN — CLONIDINE HYDROCHLORIDE 0.1 MG: 0.1 TABLET ORAL at 23:17

## 2019-03-22 RX ADMIN — CARVEDILOL 6.25 MG: 6.25 TABLET, FILM COATED ORAL at 17:45

## 2019-03-22 RX ADMIN — AMPICILLIN SODIUM AND SULBACTAM SODIUM 3 G: 2; 1 INJECTION, POWDER, FOR SOLUTION INTRAMUSCULAR; INTRAVENOUS at 00:15

## 2019-03-22 RX ADMIN — AMLODIPINE BESYLATE 5 MG: 5 TABLET ORAL at 12:19

## 2019-03-22 RX ADMIN — FUROSEMIDE 40 MG: 10 INJECTION, SOLUTION INTRAMUSCULAR; INTRAVENOUS at 17:45

## 2019-03-22 RX ADMIN — IOHEXOL 100 ML: 350 INJECTION, SOLUTION INTRAVENOUS at 17:11

## 2019-03-22 RX ADMIN — INSULIN HUMAN 1 UNITS: 100 INJECTION, SOLUTION PARENTERAL at 00:15

## 2019-03-22 RX ADMIN — ASPIRIN 325 MG: 325 TABLET ORAL at 05:28

## 2019-03-22 RX ADMIN — DIGOXIN 250 MCG: 125 TABLET ORAL at 17:45

## 2019-03-22 RX ADMIN — LOSARTAN POTASSIUM 25 MG: 25 TABLET ORAL at 05:28

## 2019-03-22 RX ADMIN — HEPARIN SODIUM: 1000 INJECTION INTRAVENOUS; SUBCUTANEOUS at 16:37

## 2019-03-22 RX ADMIN — FUROSEMIDE 40 MG: 10 INJECTION, SOLUTION INTRAMUSCULAR; INTRAVENOUS at 05:28

## 2019-03-22 RX ADMIN — NITROGLYCERIN 10 ML: 20 INJECTION INTRAVENOUS at 16:37

## 2019-03-22 RX ADMIN — ATORVASTATIN CALCIUM 80 MG: 80 TABLET, FILM COATED ORAL at 17:48

## 2019-03-22 RX ADMIN — HUMAN ALBUMIN MICROSPHERES AND PERFLUTREN 3 ML: 10; .22 INJECTION, SOLUTION INTRAVENOUS at 12:51

## 2019-03-22 RX ADMIN — INSULIN HUMAN 2 UNITS: 100 INJECTION, SOLUTION PARENTERAL at 23:20

## 2019-03-22 RX ADMIN — LIDOCAINE HYDROCHLORIDE: 20 INJECTION, SOLUTION INFILTRATION; PERINEURAL at 16:36

## 2019-03-22 RX ADMIN — INSULIN HUMAN 2 UNITS: 100 INJECTION, SOLUTION PARENTERAL at 05:37

## 2019-03-22 RX ADMIN — AMPICILLIN SODIUM AND SULBACTAM SODIUM 3 G: 2; 1 INJECTION, POWDER, FOR SOLUTION INTRAMUSCULAR; INTRAVENOUS at 11:10

## 2019-03-22 RX ADMIN — DOXYCYCLINE 100 MG: 100 TABLET, FILM COATED ORAL at 05:28

## 2019-03-22 RX ADMIN — AMPICILLIN SODIUM AND SULBACTAM SODIUM 3 G: 2; 1 INJECTION, POWDER, FOR SOLUTION INTRAMUSCULAR; INTRAVENOUS at 05:27

## 2019-03-22 RX ADMIN — SODIUM CHLORIDE: 9 INJECTION, SOLUTION INTRAVENOUS at 17:47

## 2019-03-22 RX ADMIN — MIDAZOLAM HYDROCHLORIDE 2 MG: 1 INJECTION, SOLUTION INTRAMUSCULAR; INTRAVENOUS at 16:54

## 2019-03-22 RX ADMIN — VERAPAMIL HYDROCHLORIDE 5 MG: 2.5 INJECTION, SOLUTION INTRAVENOUS at 16:37

## 2019-03-22 ASSESSMENT — COPD QUESTIONNAIRES
DURING THE PAST 4 WEEKS HOW MUCH DID YOU FEEL SHORT OF BREATH: SOME OF THE TIME
COPD SCREENING SCORE: 2
HAVE YOU SMOKED AT LEAST 100 CIGARETTES IN YOUR ENTIRE LIFE: NO/DON'T KNOW
DO YOU EVER COUGH UP ANY MUCUS OR PHLEGM?: NO/ONLY WITH OCCASIONAL COLDS OR INFECTIONS

## 2019-03-22 ASSESSMENT — ENCOUNTER SYMPTOMS
BLURRED VISION: 0
DIAPHORESIS: 0
MYALGIAS: 0
SHORTNESS OF BREATH: 1
TREMORS: 0
ORTHOPNEA: 1
COUGH: 1
NAUSEA: 0
PND: 1
HEMOPTYSIS: 0
NECK PAIN: 0
SEIZURES: 0
CHILLS: 0
PALPITATIONS: 0
FEVER: 0
FALLS: 0
BLOOD IN STOOL: 0
FOCAL WEAKNESS: 0
DIARRHEA: 0
BACK PAIN: 0
DEPRESSION: 0
FLANK PAIN: 0
CONSTIPATION: 0
SPEECH CHANGE: 0
DOUBLE VISION: 0
HEADACHES: 0
VOMITING: 0
DIZZINESS: 0
SENSORY CHANGE: 0
LOSS OF CONSCIOUSNESS: 0
CLAUDICATION: 0
HEARTBURN: 0
ABDOMINAL PAIN: 0
TINGLING: 0
SPUTUM PRODUCTION: 0
WEAKNESS: 1

## 2019-03-22 ASSESSMENT — LIFESTYLE VARIABLES
SUBSTANCE_ABUSE: 1
EVER_SMOKED: YES

## 2019-03-22 NOTE — PROGRESS NOTES
Hospital Medicine Daily Progress Note    Date of Service  3/22/2019    Chief Complaint  53 y.o. male admitted 3/20/2019 with chest pain    Hospital Course    Patient resented with chest pain to the emergency department on March 20, 2019.  Underlying history of type 2 diabetes mellitus, methamphetamine abuse, nicotine dependence, nonmedical compliance.  He was found to have   NSTEMI, cardiology consulted      Interval Problem Update  Patient seen and evaluated on rounds  Discussed with nursing staff on rounds  Today he still having some shortness of breath, improved compared to yesterday  Today he still denying any chest pain  Otherwise complaining of a cough, this is dry, nonproductive, improved compared to yesterday  No fever or chills  Fatigue and lethargy, improved compared to yesterday  Ongoing methamphetamine abuse prior to presentation  Ongoing nicotine dependence prior to presentation  Underlying morbid obesity  Denies any history of pulmonary problems  Some orthopnea, paroxysmal nocturnal dyspnea and persistent lower extremity edema at this time  No other complaints at this time  Cardiology team is following, plan coronary from today  Stop antibiotics    Consultants/Specialty  Cardiology    Code Status  Full code    Disposition  Continue telemetry monitoring  Anticipate discharge home once acute issues have resolved    Review of Systems  Review of Systems   Constitutional: Positive for malaise/fatigue. Negative for chills, diaphoresis and fever.   HENT: Negative for hearing loss.    Eyes: Negative for blurred vision and double vision.   Respiratory: Positive for cough and shortness of breath. Negative for hemoptysis and sputum production.    Cardiovascular: Positive for orthopnea, leg swelling and PND. Negative for chest pain, palpitations and claudication.   Gastrointestinal: Negative for abdominal pain, blood in stool, constipation, diarrhea, heartburn, melena, nausea and vomiting.   Genitourinary:  Positive for frequency (Lasix related). Negative for dysuria, flank pain, hematuria and urgency.   Musculoskeletal: Negative for back pain, falls, joint pain, myalgias and neck pain.   Skin: Negative for itching and rash.   Neurological: Positive for weakness. Negative for dizziness, tingling, tremors, sensory change, speech change, focal weakness, seizures, loss of consciousness and headaches.   Psychiatric/Behavioral: Positive for substance abuse. Negative for depression and suicidal ideas.        Physical Exam  Temp:  [36.1 °C (97 °F)-36.7 °C (98 °F)] 36.1 °C (97 °F)  Pulse:  [107-109] 108  Resp:  [18-22] 18  BP: (139-167)/() 150/98  SpO2:  [94 %-99 %] 97 %    Physical Exam   Constitutional: He is oriented to person, place, and time. He appears well-developed and well-nourished. No distress.   Body mass index is 40.83 kg/m².   HENT:   Head: Normocephalic.   Mouth/Throat: Oropharynx is clear and moist. No oropharyngeal exudate.   Eyes: Pupils are equal, round, and reactive to light. Conjunctivae and EOM are normal. No scleral icterus.   Neck: Normal range of motion. JVD present. No thyromegaly present.   Cardiovascular: Normal rate and regular rhythm.    Murmur heard.  Pulmonary/Chest: No stridor. No respiratory distress. He has no wheezes. He has rales.   Abdominal: Soft. Bowel sounds are normal. He exhibits no distension. There is no tenderness. There is no rebound and no guarding.   Musculoskeletal: He exhibits edema. He exhibits no tenderness or deformity.   Lymphadenopathy:     He has no cervical adenopathy.   Neurological: He is alert and oriented to person, place, and time. He has normal reflexes. No cranial nerve deficit.   Skin: Skin is warm and dry. He is not diaphoretic.   Psychiatric: He has a normal mood and affect. His behavior is normal. Judgment normal.       Fluids    Intake/Output Summary (Last 24 hours) at 03/22/19 6827  Last data filed at 03/22/19 1400   Gross per 24 hour   Intake              1464 ml   Output             4560 ml   Net            -3096 ml       Laboratory  Recent Labs      03/20/19   2221  03/21/19   0346  03/22/19   0150   WBC  15.9*  15.0*  11.4*   RBC  4.73  4.53*  4.60*   HEMOGLOBIN  13.8*  13.1*  13.2*   HEMATOCRIT  42.5  41.0*  42.3   MCV  89.9  90.5  92.0   MCH  29.2  28.9  28.7   MCHC  32.5*  32.0*  31.2*   RDW  48.7  48.8  49.1   PLATELETCT  279  244  245   MPV  9.7  9.7  9.4     Recent Labs      03/20/19   2221  03/21/19   0346  03/22/19   0150   SODIUM  137  133*  141   POTASSIUM  4.3  4.7  4.4   CHLORIDE  107  101  106   CO2  22  27  27   GLUCOSE  169*  254*  178*   BUN  22  22  21   CREATININE  1.24  1.34  1.29   CALCIUM  8.7  8.7  8.5     Recent Labs      03/20/19   2229   03/21/19   1201  03/22/19   0150  03/22/19   0816   APTT  46.1*   < >  78.3*  97.6*  118.0*   INR  1.23*   --    --   1.20*   --     < > = values in this interval not displayed.     Recent Labs      03/22/19   0150   BNPBTYPENAT  527*     Recent Labs      03/22/19   0150   TRIGLYCERIDE  74   HDL  26*   LDL  72       Imaging  EC-ECHOCARDIOGRAM COMPLETE W/ CONT   Final Result      DX-CHEST-LIMITED (1 VIEW)   Final Result      Stable chest findings compared with 3/20.               INTERPRETING LOCATION: [address] [place] [address], [facility]      DX-CHEST-PORTABLE (1 VIEW)   Final Result         1.  Pulmonary edema and/or infiltrates.   2.  Cardiomegaly      OUTSIDE IMAGES-CT CHEST   Final Result      OUTSIDE IMAGES-DX CHEST   Final Result      CL-LEFT HEART CATHETERIZATION WITH POSSIBLE INTERVENTION    (Results Pending)        Assessment/Plan  NSTEMI (non-ST elevated myocardial infarction) (HCC)- (present on admission)   Assessment & Plan    At this time this patient remains on IV heparin, upon evaluation by me at 9:00 this morning.  He is awaiting coronary angiography today, I am personally monitoring the heparin drip at this time.    Echocardiogram with depressed EF of 40% and pulmonary hypertension    Continue  aspirin 325, high intensity statin therapy, ARB  Carvedilol  Continue IV heparin, I am monitoring this patient's APTT, adverse effects related to anticoagulation  Daily CBC, BMP  Plan for coronary angiography per cardiology team, this is planned for today, patient remains n.p.o.  This may be secondary to underlying congestive heart failure, methamphetamine abuse     Cough- (present on admission)   Assessment & Plan    This is secondary to pulmonary edema, cardiogenic  No infectious concerns apparent  Stop antibiotics     Bandemia- (present on admission)   Assessment & Plan    Stress related, monitor     Acute systolic congestive heart failure (HCC)- (present on admission)   Assessment & Plan    EF 40%  Maintain IV Lasix, 20 mg IV twice daily  Monitoring renal function, electrolytes and I/os, daily weights with diuresis  Continue losartan  Carvedilol  Consider Aldactone over the coming days  We will continue close clinical monitoring  Evaluation for ischemic etiologies per cardiology team     Essential hypertension- (present on admission)   Assessment & Plan    Continue afterload reduction     Dyslipidemia- (present on admission)   Assessment & Plan    Continue atorvastatin, check lipid profile     Type 2 diabetes mellitus with hyperglycemia, without long-term current use of insulin (HCC)- (present on admission)   Assessment & Plan    Hemoglobin A1c 7.8, holding oral hypoglycemic agents  Sliding scale insulin No. 2     Class 3 severe obesity due to excess calories without serious comorbidity with body mass index (BMI) of 40.0 to 44.9 in adult (HCC)- (present on admission)   Assessment & Plan    Body mass index is 40.96 kg/m².     Normocytic anemia- (present on admission)   Assessment & Plan    Mild, no evidence of acute bleeding  Recommend further evaluation and management per PCP in the outpatient setting     Amphetamine abuse (HCC)- (present on admission)   Assessment & Plan    Discussed with patient on March 21,  2019  Discussed his positive urine toxicology  Patient in agreement, ongoing methamphetamine use prior to presentation  Reports that he smokes methamphetamine  Last used 2-3 days prior to presentation  Intermittently has persisted to use methamphetamines  Patient has been counseled and educated regarding cessation of drugs  Adverse effects of methamphetamines including cardiac discussed extensively with the patient  Benefits of cessation discussed with the patient          VTE prophylaxis: IV Heparin

## 2019-03-22 NOTE — PROGRESS NOTES
Pt transported to cath lab by bed, on zoll, with Eda DUARTE and transport. Monitor room notified.

## 2019-03-22 NOTE — PROGRESS NOTES
[facility] Cardiology Follow-up Consult Note    Date of note:    3/21/2019      Consulting Physician: Stefani Helms M.D.      Chief Complaint   Patient presents with   • Chest Pain     Interfacility transfer from [facility] for NSTEMI       HPI:  52 yo male with PMHx of HTN, HLD, tobacco use presents with 1-2 weeks of worsening dyspnea, orthopnea, PND, central chest pressure and 30 lb weight gain. Admitted and found to have elevated trop, urine + for amphetamines and symptoms suggestive of acute heart failure. Patient was transferred to [facility] for further evaluation with cardiac catheterization. Due to respiratory status and volume overload he was felt to be unable to lie flat and thus tolerate cath. He was started on heparin drip with diuresis and plan for coronary angiogram once more stable.       Interim Events:  - currently chest pain free, breathing improved, able to lay flat better than previously  - net -3.6 L since admission   - plan for cardiac catheterization this afternoon   - echocardiogram pending   - blood pressure still elevated, add low dose amlodipine, given concern for acute decompensated heart failure will not initiate BB as of yet, consider starting following echo/cath results       ROS  No NV, No Bleeding, No dizziness, + SOB, denies chest pain or palpitations, feels tired and fatigue, + weight gain   All other review of systems reviewed and negative.    Past medical, surgical, social, and family history reviewed and unchanged from admission except as noted in assessment and plan.    Medications: Reviewed in MAR  Current Facility-Administered Medications   Medication Dose Frequency Provider Last Rate Last Dose   • [START ON 3/23/2019] losartan (COZAAR) tablet 50 mg  50 mg DAILY Magi Santos M.D.       • amLODIPine (NORVASC) tablet 5 mg  5 mg Q DAY Magi Santos M.D.       • polyethylene glycol/lytes (MIRALAX) PACKET 1 Packet  1 Packet BID Stefani Helms M.D.   1 Packet at 03/22/19 0528   •  ampicillin/sulbactam (UNASYN) 3 g in  mL IVPB  3 g Q6HRS Stefani Helms M.D. 200 mL/hr at 03/22/19 1110 3 g at 03/22/19 1110   • doxycycline monohydrate (ADOXA) tablet 100 mg  100 mg Q12HRS Stefani Helms M.D.   100 mg at 03/22/19 0528   • furosemide (LASIX) injection 40 mg  40 mg BID DIURETIC Magi Santos M.D.   40 mg at 03/22/19 0528   • heparin injection 3,800 Units  3,800 Units PRN Jean-Paul Dyson M.D.   3,800 Units at 03/20/19 2324    And   • heparin infusion 25,000 units in 500 ml 0.45% nacl   Continuous Jean-Paul Dyson M.D. 31 mL/hr at 03/22/19 0856 1,550 Units/hr at 03/22/19 0856   • acetaminophen (TYLENOL) tablet 650 mg  650 mg Q6HRS PRN Jean-Paul Dyson M.D.       • ondansetron (ZOFRAN) syringe/vial injection 4 mg  4 mg Q4HRS PRN Jean-Paul Dyson M.D.       • ondansetron (ZOFRAN ODT) dispertab 4 mg  4 mg Q4HRS PRN Jean-Paul Dyson M.D.       • morphine (pf) 4 mg/ml injection 2-4 mg  2-4 mg Q5 MIN PRN Jean-Paul Dyson M.D.       • nitroglycerin (NITROSTAT) tablet 0.4 mg  0.4 mg Q5 MIN PRN Jean-Paul Dyson M.D.       • atorvastatin (LIPITOR) tablet 80 mg  80 mg Q EVENING Jean-Paul Dyson M.D.   80 mg at 03/21/19 1743   • aspirin (ASA) tablet 325 mg  325 mg DAILY Jean-Paul Dyson M.D.   325 mg at 03/22/19 0528    Or   • aspirin (ASA) chewable tab 324 mg  324 mg DAILY Jean-Paul Dyson M.D.        Or   • aspirin (ASA) suppository 300 mg  300 mg DAILY Jean-Paul Dyson M.D.       • digoxin (LANOXIN) tablet 250 mcg  250 mcg DAILY AT 1800 Pantera Ritchie M.D.   250 mcg at 03/21/19 1743   • insulin regular (HUMULIN R) injection 1-6 Units  1-6 Units Q6HRS Jean-Paul Dyson M.D.   Stopped at 03/22/19 1200    And   • glucose 4 g chewable tablet 16 g  16 g Q15 MIN PRN JeanP-aul Dyson M.D.        And   • dextrose 50% (D50W) injection 25 mL  25 mL Q15 MIN PRN Jean-Paul Dyson M.D.         Last reviewed on 3/20/2019 11:07 PM by Claribel Nixon  No Known Allergies    Physical Exam  Body mass index is 40.29 kg/m². Blood  "pressure (!) 161/105, pulse (!) 108, temperature 36.1 °C (97 °F), temperature source Temporal, resp. rate (!) 22, height 1.727 m (5' 8\"), weight 120.2 kg (264 lb 15.9 oz), SpO2 99 %.   Vitals:    03/21/19 2035 03/22/19 0025 03/22/19 0434 03/22/19 0830   BP: (!) 167/110 139/97 153/99 (!) 161/105   Pulse: (!) 109 (!) 109 (!) 107 (!) 108   Resp: (!) 22 20 20 (!) 22   Temp: 36.7 °C (98 °F) 36.6 °C (97.9 °F) 36.1 °C (97 °F) 36.1 °C (97 °F)   TempSrc: Temporal Temporal Temporal Temporal   SpO2: 96% 94% 98% 99%   Weight:       Height:        Oxygen Therapy:  Pulse Oximetry: 99 %, O2 (LPM): 0, O2 Delivery: None (Room Air)    General: non-toxic appearing male, lying in bed, no acute distress   HEENT: NCAT, conjunctiva normal no, JVD   Lungs: bibasilar crackles, no rales or rhonchi   Heart: tachycardic, no murmurs, no rubs or gallops no S3   EXT: 2+ pitting edema bilaterally. + pedal pulses. no cyanosis  Abdomen: soft, non tender, non distended  Neurological: A/O x 3. No focal sensory deficits  Skin: Warm extremities, slightly diaphoretic     Labs (personally reviewed and notable for):   Recent Results (from the past 24 hour(s))   APTT    Collection Time: 03/21/19 12:01 PM   Result Value Ref Range    APTT 78.3 (H) 24.7 - 36.0 sec   HEMOGLOBIN A1C    Collection Time: 03/21/19  1:27 PM   Result Value Ref Range    Glycohemoglobin 7.8 (H) 0.0 - 5.6 %    Est Avg Glucose 177 mg/dL   BLOOD CULTURE    Collection Time: 03/21/19  1:27 PM   Result Value Ref Range    Significant Indicator NEG     Source BLD     Site PERIPHERAL     Blood Culture       No Growth    Note: Blood cultures are incubated for 5 days and  are monitored continuously.Positive blood cultures  are called to the RN and reported as soon as  they are identified.     BLOOD CULTURE    Collection Time: 03/21/19  1:27 PM   Result Value Ref Range    Significant Indicator NEG     Source BLD     Site PERIPHERAL     Blood Culture       No Growth    Note: Blood cultures are " incubated for 5 days and  are monitored continuously.Positive blood cultures  are called to the RN and reported as soon as  they are identified.     WESTERGREN SED RATE    Collection Time: 19  1:27 PM   Result Value Ref Range    Sed Rate Westergren 46 (H) 0 - 20 mm/hour   CRP QUANTITIVE (NON-CARDIAC)    Collection Time: 19  1:27 PM   Result Value Ref Range    Stat C-Reactive Protein 8.51 (H) 0.00 - 0.75 mg/dL   PROCALCITONIN    Collection Time: 19  1:27 PM   Result Value Ref Range    Procalcitonin <0.05 <0.25 ng/mL   ACCU-CHEK GLUCOSE    Collection Time: 19  4:45 PM   Result Value Ref Range    Glucose - Accu-Ck 190 (H) 65 - 99 mg/dL   ACCU-CHEK GLUCOSE    Collection Time: 19 12:08 AM   Result Value Ref Range    Glucose - Accu-Ck 175 (H) 65 - 99 mg/dL   EKG    Collection Time: 19 12:27 AM   Result Value Ref Range    Report       [facility]    Test Date:  2019  Pt Name:    TONYA FERNANDEZ                 Department: 183  MRN:        [id number]                      Room:       T838  Gender:     Male                         Technician: [facility]  :        [id number]                   Requested By:CHRISTIN ALTMAN  Order #:    [id number]                    Reading MD: Swetha Gardner    Measurements  Intervals                                Axis  Rate:       108                          P:          38  SD:         172                          QRS:        -17  QRSD:       84                           T:          45  QT:         287  QTc:        385    Interpretive Statements  SINUS TACHYCARDIA  PROBABLE LEFT ATRIAL ABNORMALITY  Delayed R wave progression  LOW VOLTAGE IN FRONTAL LEADS  Nonspecific T wave changes  Electronically Signed On 3- 3:51:04 PDT by Swetha Gardner     Lipid Profile    Collection Time: 19  1:50 AM   Result Value Ref Range    Cholesterol,Tot 113 100 - 199 mg/dL    Triglycerides 74 0 - 149 mg/dL    HDL 26 (A) >=40 mg/dL    LDL 72 <100 mg/dL   CBC WITH  DIFFERENTIAL    Collection Time: 03/22/19  1:50 AM   Result Value Ref Range    WBC 11.4 (H) 4.8 - 10.8 K/uL    RBC 4.60 (L) 4.70 - 6.10 M/uL    Hemoglobin 13.2 (L) 14.0 - 18.0 g/dL    Hematocrit 42.3 42.0 - 52.0 %    MCV 92.0 81.4 - 97.8 fL    MCH 28.7 27.0 - 33.0 pg    MCHC 31.2 (L) 33.7 - 35.3 g/dL    RDW 49.1 35.9 - 50.0 fL    Platelet Count 245 164 - 446 K/uL    MPV 9.4 9.0 - 12.9 fL    Neutrophils-Polys 65.70 44.00 - 72.00 %    Lymphocytes 17.60 (L) 22.00 - 41.00 %    Monocytes 9.90 0.00 - 13.40 %    Eosinophils 5.00 0.00 - 6.90 %    Basophils 0.80 0.00 - 1.80 %    Immature Granulocytes 1.00 (H) 0.00 - 0.90 %    Nucleated RBC 0.00 /100 WBC    Neutrophils (Absolute) 7.49 (H) 1.82 - 7.42 K/uL    Lymphs (Absolute) 2.01 1.00 - 4.80 K/uL    Monos (Absolute) 1.13 (H) 0.00 - 0.85 K/uL    Eos (Absolute) 0.57 (H) 0.00 - 0.51 K/uL    Baso (Absolute) 0.09 0.00 - 0.12 K/uL    Immature Granulocytes (abs) 0.11 0.00 - 0.11 K/uL    NRBC (Absolute) 0.00 K/uL   Comp Metabolic Panel    Collection Time: 03/22/19  1:50 AM   Result Value Ref Range    Sodium 141 135 - 145 mmol/L    Potassium 4.4 3.6 - 5.5 mmol/L    Chloride 106 96 - 112 mmol/L    Co2 27 20 - 33 mmol/L    Anion Gap 8.0 0.0 - 11.9    Glucose 178 (H) 65 - 99 mg/dL    Bun 21 8 - 22 mg/dL    Creatinine 1.29 0.50 - 1.40 mg/dL    Calcium 8.5 8.5 - 10.5 mg/dL    AST(SGOT) 30 12 - 45 U/L    ALT(SGPT) 21 2 - 50 U/L    Alkaline Phosphatase 62 30 - 99 U/L    Total Bilirubin 0.6 0.1 - 1.5 mg/dL    Albumin 2.6 (L) 3.2 - 4.9 g/dL    Total Protein 5.5 (L) 6.0 - 8.2 g/dL    Globulin 2.9 1.9 - 3.5 g/dL    A-G Ratio 0.9 g/dL   MAGNESIUM    Collection Time: 03/22/19  1:50 AM   Result Value Ref Range    Magnesium 1.6 1.5 - 2.5 mg/dL   PHOSPHORUS    Collection Time: 03/22/19  1:50 AM   Result Value Ref Range    Phosphorus 4.3 2.5 - 4.5 mg/dL   APTT    Collection Time: 03/22/19  1:50 AM   Result Value Ref Range    APTT 97.6 (H) 24.7 - 36.0 sec   Prothrombin Time    Collection Time:  03/22/19  1:50 AM   Result Value Ref Range    PT 15.3 (H) 12.0 - 14.6 sec    INR 1.20 (H) 0.87 - 1.13   PROCALCITONIN    Collection Time: 03/22/19  1:50 AM   Result Value Ref Range    Procalcitonin 0.06 <0.25 ng/mL   BTYPE NATRIURETIC PEPTIDE    Collection Time: 03/22/19  1:50 AM   Result Value Ref Range    B Natriuretic Peptide 527 (H) 0 - 100 pg/mL   ESTIMATED GFR    Collection Time: 03/22/19  1:50 AM   Result Value Ref Range    GFR If African American >60 >60 mL/min/1.73 m 2    GFR If Non African American 58 (A) >60 mL/min/1.73 m 2   ACCU-CHEK GLUCOSE    Collection Time: 03/22/19  5:35 AM   Result Value Ref Range    Glucose - Accu-Ck 231 (H) 65 - 99 mg/dL   APTT    Collection Time: 03/22/19  8:16 AM   Result Value Ref Range    APTT 118.0 (HH) 24.7 - 36.0 sec   ACCU-CHEK GLUCOSE    Collection Time: 03/22/19 11:12 AM   Result Value Ref Range    Glucose - Accu-Ck 160 (H) 65 - 99 mg/dL       Cardiac Imaging and Procedures Review:    EKG and telemetry tracings personally reviewed    EKG- normal sinus rhythm, diffuse T wave changes no depression or elevation       ASSESSMENT & PLAN    53-year-old male, suspect new onset heart failure with reduced ejection fraction and NSTEMI possible substance induced given + amphetamines in urine.  We will aggressively diurese and plan for cardiac cath      1. NSTEMI  - possibly drug induced vs CAD   - continue aspirin and heparin drip   - continue statin   - plan for cardiac today      2. Likely CHFrEF  - echo pending   - continue losartain 25, digoxin 0.25, hold beta blocker due to acute decompensated HF  - aggressive diuresis with 40 mg IV BID      3. T2DM  - glucose elevated  - hold metformin   - repeat Ha1c 7.8  - management per primary team       4. HTN  - elevated but stable, continue losartan and lasix, start low dose amlodipine   - will add BB following echo/cath results      5. HLD  - lipid panel within goal range    - cont. Statin     6. Substance use   - + urine for  "amphetamines, pt denies use, admits to hx of using   \"speed\"  - educate on importance of substance use cessation      7. Tobacco use   - educate on importance of smoking cessation     8. Cough   - possible infectious source given leukocytosis, on antibiotic therapy   - management per primary team     Cardiology will Continue to follow     It is my pleasure to participate in the care of Mr. Hyatt.  Please do not hesitate to contact me with questions or concerns.    Magi Santos  PGY 2 [employer] [facility]   "

## 2019-03-22 NOTE — THERAPY
PT cardiac orders received; Pt is currently awaiting for angiogram procedure today; will hold until procedure has been complete.

## 2019-03-22 NOTE — PROGRESS NOTES
Bedside report received from night shift RN. Pt is A&O x 4. Pt. Is in bed. Pt. Updated on plan of care for the day. All questions answered. Pt.s bed is locked, in lowest position. Pt. Has no other needs at this time.

## 2019-03-22 NOTE — CARE PLAN
Problem: Safety  Goal: Will remain free from injury  Outcome: PROGRESSING AS EXPECTED  Pt. educated on fall precautions and instructed to use the call light for assistance. Pt. wearing non-slip socks. IV pole on the same side as the bathroom.Upper side rails up. Appropriate signs posted. Call light and personal belongings within reach. Pt. has no other needs at this time.     Problem: Knowledge Deficit  Goal: Knowledge of disease process/condition, treatment plan, diagnostic tests, and medications will improve  Outcome: PROGRESSING AS EXPECTED  Educated pt on plan of care for the day, treatment, medications and diagnosis.

## 2019-03-22 NOTE — CARE PLAN
Problem: Safety  Goal: Will remain free from falls  Outcome: PROGRESSING AS EXPECTED  Patient's bed is locked and in lowest position, wearing non skid socks and has adequate lighting.

## 2019-03-23 LAB
ANION GAP SERPL CALC-SCNC: 5 MMOL/L (ref 0–11.9)
BUN SERPL-MCNC: 20 MG/DL (ref 8–22)
CALCIUM SERPL-MCNC: 8.2 MG/DL (ref 8.5–10.5)
CHLORIDE SERPL-SCNC: 104 MMOL/L (ref 96–112)
CO2 SERPL-SCNC: 27 MMOL/L (ref 20–33)
CREAT SERPL-MCNC: 1.28 MG/DL (ref 0.5–1.4)
GLUCOSE BLD-MCNC: 108 MG/DL (ref 65–99)
GLUCOSE BLD-MCNC: 163 MG/DL (ref 65–99)
GLUCOSE BLD-MCNC: 191 MG/DL (ref 65–99)
GLUCOSE BLD-MCNC: 331 MG/DL (ref 65–99)
GLUCOSE SERPL-MCNC: 209 MG/DL (ref 65–99)
POTASSIUM SERPL-SCNC: 4.1 MMOL/L (ref 3.6–5.5)
SODIUM SERPL-SCNC: 136 MMOL/L (ref 135–145)

## 2019-03-23 PROCEDURE — 700102 HCHG RX REV CODE 250 W/ 637 OVERRIDE(OP): Performed by: HOSPITALIST

## 2019-03-23 PROCEDURE — A9270 NON-COVERED ITEM OR SERVICE: HCPCS | Performed by: STUDENT IN AN ORGANIZED HEALTH CARE EDUCATION/TRAINING PROGRAM

## 2019-03-23 PROCEDURE — 36415 COLL VENOUS BLD VENIPUNCTURE: CPT

## 2019-03-23 PROCEDURE — A9270 NON-COVERED ITEM OR SERVICE: HCPCS | Performed by: INTERNAL MEDICINE

## 2019-03-23 PROCEDURE — B2111ZZ FLUOROSCOPY OF MULTIPLE CORONARY ARTERIES USING LOW OSMOLAR CONTRAST: ICD-10-PCS | Performed by: INTERNAL MEDICINE

## 2019-03-23 PROCEDURE — 82962 GLUCOSE BLOOD TEST: CPT | Mod: 91

## 2019-03-23 PROCEDURE — 700102 HCHG RX REV CODE 250 W/ 637 OVERRIDE(OP): Performed by: STUDENT IN AN ORGANIZED HEALTH CARE EDUCATION/TRAINING PROGRAM

## 2019-03-23 PROCEDURE — B2151ZZ FLUOROSCOPY OF LEFT HEART USING LOW OSMOLAR CONTRAST: ICD-10-PCS | Performed by: INTERNAL MEDICINE

## 2019-03-23 PROCEDURE — 700102 HCHG RX REV CODE 250 W/ 637 OVERRIDE(OP): Performed by: INTERNAL MEDICINE

## 2019-03-23 PROCEDURE — 99232 SBSQ HOSP IP/OBS MODERATE 35: CPT | Performed by: INTERNAL MEDICINE

## 2019-03-23 PROCEDURE — 80048 BASIC METABOLIC PNL TOTAL CA: CPT

## 2019-03-23 PROCEDURE — 4A023N7 MEASUREMENT OF CARDIAC SAMPLING AND PRESSURE, LEFT HEART, PERCUTANEOUS APPROACH: ICD-10-PCS | Performed by: INTERNAL MEDICINE

## 2019-03-23 PROCEDURE — A9270 NON-COVERED ITEM OR SERVICE: HCPCS | Performed by: HOSPITALIST

## 2019-03-23 PROCEDURE — 770020 HCHG ROOM/CARE - TELE (206)

## 2019-03-23 PROCEDURE — 99232 SBSQ HOSP IP/OBS MODERATE 35: CPT | Mod: GC | Performed by: INTERNAL MEDICINE

## 2019-03-23 PROCEDURE — 700111 HCHG RX REV CODE 636 W/ 250 OVERRIDE (IP): Performed by: STUDENT IN AN ORGANIZED HEALTH CARE EDUCATION/TRAINING PROGRAM

## 2019-03-23 RX ORDER — CLOPIDOGREL BISULFATE 75 MG/1
75 TABLET ORAL DAILY
Status: DISCONTINUED | OUTPATIENT
Start: 2019-03-23 | End: 2019-03-26 | Stop reason: HOSPADM

## 2019-03-23 RX ORDER — DIGOXIN 125 MCG
125 TABLET ORAL DAILY
Status: DISCONTINUED | OUTPATIENT
Start: 2019-03-23 | End: 2019-03-24

## 2019-03-23 RX ORDER — LOSARTAN POTASSIUM 50 MG/1
50 TABLET ORAL 2 TIMES DAILY
Status: DISCONTINUED | OUTPATIENT
Start: 2019-03-23 | End: 2019-03-26 | Stop reason: HOSPADM

## 2019-03-23 RX ORDER — CARVEDILOL 12.5 MG/1
12.5 TABLET ORAL 2 TIMES DAILY WITH MEALS
Status: DISCONTINUED | OUTPATIENT
Start: 2019-03-23 | End: 2019-03-24

## 2019-03-23 RX ADMIN — FUROSEMIDE 40 MG: 10 INJECTION, SOLUTION INTRAMUSCULAR; INTRAVENOUS at 15:03

## 2019-03-23 RX ADMIN — INSULIN HUMAN 1 UNITS: 100 INJECTION, SOLUTION PARENTERAL at 05:43

## 2019-03-23 RX ADMIN — POLYETHYLENE GLYCOL 3350 1 PACKET: 17 POWDER, FOR SOLUTION ORAL at 05:41

## 2019-03-23 RX ADMIN — LOSARTAN POTASSIUM 50 MG: 50 TABLET ORAL at 17:34

## 2019-03-23 RX ADMIN — LOSARTAN POTASSIUM 50 MG: 50 TABLET ORAL at 05:40

## 2019-03-23 RX ADMIN — INSULIN HUMAN 1 UNITS: 100 INJECTION, SOLUTION PARENTERAL at 17:39

## 2019-03-23 RX ADMIN — CARVEDILOL 6.25 MG: 6.25 TABLET, FILM COATED ORAL at 05:40

## 2019-03-23 RX ADMIN — DIGOXIN 125 MCG: 125 TABLET ORAL at 17:34

## 2019-03-23 RX ADMIN — FUROSEMIDE 40 MG: 10 INJECTION, SOLUTION INTRAMUSCULAR; INTRAVENOUS at 05:40

## 2019-03-23 RX ADMIN — CLOPIDOGREL BISULFATE 75 MG: 75 TABLET ORAL at 11:52

## 2019-03-23 RX ADMIN — ATORVASTATIN CALCIUM 80 MG: 80 TABLET, FILM COATED ORAL at 17:34

## 2019-03-23 RX ADMIN — ASPIRIN 325 MG: 325 TABLET ORAL at 05:40

## 2019-03-23 RX ADMIN — CARVEDILOL 12.5 MG: 12.5 TABLET, FILM COATED ORAL at 17:34

## 2019-03-23 RX ADMIN — AMLODIPINE BESYLATE 5 MG: 5 TABLET ORAL at 05:40

## 2019-03-23 RX ADMIN — INSULIN HUMAN 4 UNITS: 100 INJECTION, SOLUTION PARENTERAL at 11:59

## 2019-03-23 ASSESSMENT — ENCOUNTER SYMPTOMS
SEIZURES: 0
SPEECH CHANGE: 0
BLURRED VISION: 0
PND: 0
SENSORY CHANGE: 0
CLAUDICATION: 0
BLOOD IN STOOL: 0
WEAKNESS: 0
FLANK PAIN: 0
TREMORS: 0
CHILLS: 0
ORTHOPNEA: 0
COUGH: 0
DIZZINESS: 0
PALPITATIONS: 0
DEPRESSION: 0
NECK PAIN: 0
ABDOMINAL PAIN: 0
SHORTNESS OF BREATH: 1
HEMOPTYSIS: 0
NAUSEA: 0
CONSTIPATION: 0
TINGLING: 0
DIAPHORESIS: 0
FEVER: 0
BACK PAIN: 0
HEADACHES: 0
FALLS: 0
DIARRHEA: 0
FOCAL WEAKNESS: 0
LOSS OF CONSCIOUSNESS: 0
HEARTBURN: 0
SPUTUM PRODUCTION: 0
MYALGIAS: 0
DOUBLE VISION: 0
VOMITING: 0

## 2019-03-23 ASSESSMENT — LIFESTYLE VARIABLES: SUBSTANCE_ABUSE: 1

## 2019-03-23 NOTE — CARE PLAN
Problem: Communication  Goal: The ability to communicate needs accurately and effectively will improve  Outcome: PROGRESSING AS EXPECTED  Pt educated on POC and medications.     Problem: Safety  Goal: Will remain free from injury  Outcome: PROGRESSING AS EXPECTED  Bedside table and call light are within reach. Bed is locked and in the lowest position. Treaded socks are on. Patient educated to call for assistance.

## 2019-03-23 NOTE — CARE PLAN
Problem: Communication  Goal: The ability to communicate needs accurately and effectively will improve  Outcome: PROGRESSING AS EXPECTED  Pt. Is oriented to call light system and calls when assistance is needed. Pt. Updated on plan of care. Pt. Encouraged to communicate any needs or concerns. Questions have been addressed.     Problem: Infection  Goal: Will remain free from infection  Outcome: PROGRESSING AS EXPECTED  Standard precautions implemented

## 2019-03-23 NOTE — DIETARY
NUTRITION SERVICES: BMI - Pt with BMI >40 (=Body mass index is 40.3 kg/m².). Weight loss counseling not appropriate in acute care setting.     RECOMMEND - Referral to outpatient nutrition services for weight management after D/C.

## 2019-03-23 NOTE — PROGRESS NOTES
"[facility] Cardiology Follow-up Consult Note    Date of note:    3/23/2019      Consulting Physician: Stefani Helms M.D.      Chief Complaint   Patient presents with   • Chest Pain     Interfacility transfer from [facility] for NSTEMI       HPI:  54 yo male with PMHx of HTN, HLD, tobacco use presents with 1-2 weeks of worsening dyspnea, orthopnea, PND, central chest pressure and 30 lb weight gain. Admitted and found to have elevated trop, urine + for amphetamines and symptoms suggestive of acute heart failure. Patient was transferred to [facility] for further evaluation with cardiac catheterization. Due to respiratory status and volume overload he was felt to be unable to lie flat and thus tolerate cath. He was started on heparin drip with diuresis and plan for coronary angiogram once more stable.       Interim Events:  - Tele:-112 , R PVC  - I & O: -7.4 L since admission   - Cardiac Cath yesterday ,\"Elevated LVEDP 38 mmHg.  Cardiomyopathy left ventricular ejection fraction 36%.  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     ROS  No chest pain/ NV, No Bleeding, No dizziness, + SOB, denies chest pain or palpitations, feels tired and fatigue, + weight gain   All other review of systems reviewed and negative.    Past medical, surgical, social, and family history reviewed and unchanged from admission except as noted in assessment and plan.    Medications: Reviewed in MAR  Current Facility-Administered Medications   Medication Dose Frequency Provider Last Rate Last Dose   • clopidogrel (PLAVIX) tablet 75 mg  75 mg DAILY Suzy Galvez M.D.       • losartan (COZAAR) tablet 50 mg  50 mg DAILY Magi Santos M.D.   50 mg at 03/23/19 0540   • amLODIPine (NORVASC) tablet 5 mg  5 mg Q DAY Magi Santos M.D.   " "5 mg at 03/23/19 0540   • carvedilol (COREG) tablet 6.25 mg  6.25 mg BID WITH MEALS Magi Santos M.D.   6.25 mg at 03/23/19 0540   • cloNIDine (CATAPRES) tablet 0.1 mg  0.1 mg TID PRN Rickey Lock M.D.   0.1 mg at 03/22/19 2317   • polyethylene glycol/lytes (MIRALAX) PACKET 1 Packet  1 Packet BID Stefani Helms M.D.   1 Packet at 03/23/19 0541   • furosemide (LASIX) injection 40 mg  40 mg BID DIURETIC Magi Santos M.D.   40 mg at 03/23/19 0540   • acetaminophen (TYLENOL) tablet 650 mg  650 mg Q6HRS PRN Jean-Paul Dyson M.D.       • ondansetron (ZOFRAN) syringe/vial injection 4 mg  4 mg Q4HRS PRN Jean-Paul Dyson M.D.       • ondansetron (ZOFRAN ODT) dispertab 4 mg  4 mg Q4HRS PRN Jean-Paul Dyson M.D.       • morphine (pf) 4 mg/ml injection 2-4 mg  2-4 mg Q5 MIN PRN Jean-Paul Dyson M.D.       • nitroglycerin (NITROSTAT) tablet 0.4 mg  0.4 mg Q5 MIN PRN Jean-Paul Dyson M.D.       • atorvastatin (LIPITOR) tablet 80 mg  80 mg Q EVENING Jean-Paul Dyson M.D.   80 mg at 03/22/19 1748   • aspirin (ASA) tablet 325 mg  325 mg DAILY Jean-Paul Dyson M.D.   325 mg at 03/23/19 0540    Or   • aspirin (ASA) chewable tab 324 mg  324 mg DAILY Jean-Paul Dyson M.D.        Or   • aspirin (ASA) suppository 300 mg  300 mg DAILY Jean-Paul Dyson M.D.       • digoxin (LANOXIN) tablet 250 mcg  250 mcg DAILY AT 1800 Pantera Ritchie M.D.   250 mcg at 03/22/19 1745   • insulin regular (HUMULIN R) injection 1-6 Units  1-6 Units Q6HRS Jean-Paul Dyson M.D.   1 Units at 03/23/19 0543    And   • glucose 4 g chewable tablet 16 g  16 g Q15 MIN PRN Jean-Paul Dyson M.D.        And   • dextrose 50% (D50W) injection 25 mL  25 mL Q15 MIN PRN Jean-Paul Dyson M.D.         Last reviewed on 3/20/2019 11:07 PM by Claribel Nixon  No Known Allergies    Physical Exam  Body mass index is 40.29 kg/m². Blood pressure 136/91, pulse 88, temperature 36.3 °C (97.3 °F), temperature source Temporal, resp. rate 20, height 1.727 m (5' 8\"), weight 120.2 " kg (264 lb 15.9 oz), SpO2 94 %.   Vitals:    03/22/19 2319 03/23/19 0015 03/23/19 0400 03/23/19 0858   BP:  158/90 145/89 136/91   Pulse: (!) 108 96 89 88   Resp: 20 20 (!) 21 20   Temp:  36.3 °C (97.4 °F) 37.2 °C (98.9 °F) 36.3 °C (97.3 °F)   TempSrc:  Temporal Temporal Temporal   SpO2: 96% 100% 95% 94%   Weight:       Height:        Oxygen Therapy:  Pulse Oximetry: 94 %, O2 (LPM): 3, O2 Delivery: Nasal Cannula    General: non-toxic appearing male, lying in bed, no acute distress   HEENT: NCAT, conjunctiva normal no, JVD   Lungs: bibasilar crackles, no rales or rhonchi   Heart: tachycardic, no murmurs, no rubs or gallops no S3   EXT: 2+ pitting edema bilaterally. + pedal pulses. no cyanosis  Abdomen: soft, non tender, non distended  Neurological: A/O x 3. No focal sensory deficits  Skin: Warm extremities, slightly diaphoretic     Labs (personally reviewed and notable for):   Recent Results (from the past 24 hour(s))   ACCU-CHEK GLUCOSE    Collection Time: 03/22/19 11:12 AM   Result Value Ref Range    Glucose - Accu-Ck 160 (H) 65 - 99 mg/dL   EC-ECHOCARDIOGRAM COMPLETE W/ CONT    Collection Time: 03/22/19 12:49 PM   Result Value Ref Range    Eject.Frac. MOD BP 41.33     Eject.Frac. MOD 4C 42.27     Eject.Frac. MOD 2C 37.85     Left Ventrical Ejection Fraction 40    ACCU-CHEK GLUCOSE    Collection Time: 03/22/19  6:01 PM   Result Value Ref Range    Glucose - Accu-Ck 108 (H) 65 - 99 mg/dL   TSH    Collection Time: 03/22/19  6:08 PM   Result Value Ref Range    TSH 0.610 0.380 - 5.330 uIU/mL   ACCU-CHEK GLUCOSE    Collection Time: 03/22/19 11:19 PM   Result Value Ref Range    Glucose - Accu-Ck 235 (H) 65 - 99 mg/dL   Basic Metabolic Panel    Collection Time: 03/23/19 12:41 AM   Result Value Ref Range    Sodium 136 135 - 145 mmol/L    Potassium 4.1 3.6 - 5.5 mmol/L    Chloride 104 96 - 112 mmol/L    Co2 27 20 - 33 mmol/L    Glucose 209 (H) 65 - 99 mg/dL    Bun 20 8 - 22 mg/dL    Creatinine 1.28 0.50 - 1.40 mg/dL     "Calcium 8.2 (L) 8.5 - 10.5 mg/dL    Anion Gap 5.0 0.0 - 11.9   ESTIMATED GFR    Collection Time: 03/23/19 12:41 AM   Result Value Ref Range    GFR If African American >60 >60 mL/min/1.73 m 2    GFR If Non African American 59 (A) >60 mL/min/1.73 m 2   ACCU-CHEK GLUCOSE    Collection Time: 03/23/19  5:39 AM   Result Value Ref Range    Glucose - Accu-Ck 163 (H) 65 - 99 mg/dL       Cardiac Imaging and Procedures Review:    EKG and telemetry tracings personally reviewed    EKG- normal sinus rhythm, diffuse T wave changes no depression or elevation       ASSESSMENT & PLAN    53-year-old male, suspect new onset heart failure with reduced ejection fraction and NSTEMI possible substance induced given + amphetamines in urine.  We will aggressively diurese and plan for cardiac cath      1. 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         2. Likely CHFrEF  - echo EF 40% , Posterolateral wall hypokinessis.  - continue losartain 25, digoxin 0.25, hold beta blocker due to acute decompensated HF  - aggressive diuresis with 40 mg IV BID      3. T2DM  - glucose elevated  - hold metformin   - repeat Ha1c 7.8  - management per primary team       4. HTN  - elevated but stable, continue losartan and lasix, start low dose amlodipine   - will add BB following echo/cath results      5. HLD  - lipid panel within goal range    - cont. Statin     6. Substance use   - + urine for amphetamines, pt denies use, admits to hx of using   \"speed\"  - educate on importance of substance use cessation      7. Tobacco use   - " educate on importance of smoking cessation     8. Cough   - possible infectious source given leukocytosis, on antibiotic therapy   - management per primary team     Cardiology will Continue to follow . Pt needs at least one more day for IV diuretics therapy.     It is my pleasure to participate in the care of Mr. Hyatt.  Please do not hesitate to contact me with questions or concerns.

## 2019-03-23 NOTE — PROCEDURES
DATE OF SERVICE:  03/22/2019    PROCEDURE:  Cardiac catheterization.  A.  Left heart catheterization.  B.  Left ventriculography.  C.  Selective coronary angiography.  D.  Right radial artery approach.  E.  Conscious sedation supervision.    PREPROCEDURE DIAGNOSES:  1.  Non-ST elevation myocardial infarction.  2.  Systolic congestive heart failure with left ventricular ejection fraction   of 40% and posterolateral wall motion abnormalities.  3.  Pulmonary hypertension, moderate at 50 mmHg.    4.  Chronic and acute methamphetamine use.      POSTPROCEDURE DIAGNOSES:  1.  Coronary artery disease including proximal left anterior descending artery   nonocclusive eccentric plaque rupture, terminal left anterior descending artery   100% occlusion at apex, first marginal branch, terminal second marginal   branch 100% occlusion and second diagonal branch ostial 50% stenosis.    2.  Left ventricular ejection fraction 36% with global hypokinesis with focal   apical akinesis.  3.  Hypertension.  4.  Elevated LVEDP.    PHYSICIAN:  Rickey Lock MD    REFERRING PHYSICIAN:  Jonas Layne MD    COMPLICATIONS:  None.    MEDICATIONS:  1.  Versed 2 mg IV.  2.  Fentanyl 100 mcg IV.  3.  Lidocaine 2% subcutaneous.    INDICATIONS:  The patient is a 53-year-old male with a history of   non-insulin-dependent diabetes mellitus, hypertension, hyperlipidemia,   chronic tobacco use, obesity and continued methamphetamine use,   who was transferred from [facility] in [place], [place]   to [facility] for management of non-ST elevation   myocardial infarction and acute decompensated systolic congestive   heart failure. Echocardiogram on 03/22/2019 showed left ventricular   ejection fraction of 40% with posterolateral hypokinesis and right   ventricular systolic pressure of 50 mmHg.  The patient is refer for a cardiac catheterization for post MI risk   stratification for future coronary events.    PROCEDURE:   After informed consent was obtained, the patient was brought to   cardiac catheterization laboratory where he was prepped, draped, and   anesthetized in usual manner.    After establishing adequate collateral circulation to the right hand with a   pressure and oximetry-guided Yakov's test, the volar surface of the right   wrist was prepped, draped and anesthetized in usual manner.    Using ultrasound guidance and a modified Seldinger technique, a 6-[place] x 10   cm introducer sheath was inserted in the right radial artery.  Heparin,   verapamil, and nitroglycerin were given via the side port.    Next, a 4-[place] JL 3.5 left coronary catheter was inserted in the ostium of   the left coronary artery and left coronary angiograms were obtained in various   projections.    Next, a 4-[place] right coronary bypass catheter was used to selectively   cannulate the right coronary and right coronary angiograms were obtained in   various projections.    Next, a 4-[place] pigtail catheter was inserted in the left ventricle under   fluoroscopic guidance.  A single plane GUZMAN left ventricular angiogram was   performed.  Pre and postangiogram LVEDP, LV and aortic pressures were   obtained.    At the end of procedure, catheters were removed.  Hemostasis was achieved with   a wrist band device.  The patient tolerated the procedure well.    CONSCIOUS SEDATION: I supervised and monitored the administration of   intravenous conscious sedation from 4:38 PM until 5:11 PM.    FINDINGS:  HEMODYNAMICS:  LEFT HEART PRESSURES:  1.  LVEDP 38 mmHg.  2.  Left ventricular systolic pressure 155 mmHg.  3.  Central aortic pressure systolic 154, diastolic 88, mean of 115 mmHg.    LEFT VENTRICULOGRAPHY:  Left ventricular chamber size is enlarged with global   hypokinesis and focal apical akinesis.  Calculated ejection fraction 36%.  No distinct apical thrombus noted.    CORONARY ARTERIOGRAPHY:  1.  Left main artery:  Left main is a large caliber vessel,  angiographically   normal, bifurcates to the left anterior descending artery and circumflex   artery.  2.  Left anterior descending artery:  Left anterior descending artery gives   rise to a large first diagonal branch, a smaller second diagonal branch, and  normal complement of septal branches and extends to the apex.  The   proximal LAD has an eccentric focal nonocclusive ruptured plaque.  The terminal   LAD at the apex is 100% occluded.  The second diagonal branch ostium has a   smooth 50% stenosis.    3.  Circumflex artery:  The circumflex artery gives rise to a first marginal branch   and a small caliber second marginalbranch.  The first marginal branch has a   mid vessel focal 50% stenosis.  The terminal aspect of the small caliber second   marginal branch appears to be 100% occluded.    4.  Right coronary artery:  The right coronary is a large caliber vessel, gives rise   to an early marginal branch that extends to the inferior wall and a bifurcating   posterolateral system.  The right coronary artery is widely paten twith mild   focal smooth atheroma and small caliber terminal segments.      PLAN:  1.  Maximize optimal guideline-directed medical therapy for the patient's   systolic congestive heart failure and coronary artery disease post-myocardial   infarction.    2.  Lifetime abstention from methamphetamine use.    3.  Discussed treatment management with Dr. Jonas Layne, attending   cardiologist.       ____________________________________     MD LIVAN JOSEPH / NTS    DD:  03/22/2019 18:25:41  DT:  03/22/2019 18:53:58    D#:  [id number]  Job#:  [id number]

## 2019-03-23 NOTE — CATH LAB
Immediate Post-Operative Note      PreOp Diagnosis:   NSTEMI  Systolic CHF  Pulmonary hypertension 50 mmHg  Chronic methamphetamine use    PostOp Diagnosis:   Hypertension  Elevated LVEDP 38 mmHg.  Cardiomyopathy left ventricular ejection fraction 36%.  Global hypokinesis with apical akinesis  Coronary artery disease proximal LAD proximal eccentric plaque rupture, terminal %, D1 ostial 50% stenosis    Procedure(s) :  Coronary Angiography, Left Heart Catheterization, Left Ventriculography    Surgeon(s):  Rickey Lock M.D.    Type of Anesthesia: Moderate Sedation    Specimen: None    Complications: None    Plan  1.  Maximize optimal guideline directed medical therapy for patient's cardiomyopathy, systolic congestive heart failure artery disease  2.  Lifetime abstention from methamphetamine use  3.  Discussed treatment management with Dr. Jonas Layne.      Rickey Lock M.D.  3/22/2019 5:13 PM

## 2019-03-24 ENCOUNTER — APPOINTMENT (OUTPATIENT)
Dept: RADIOLOGY | Facility: MEDICAL CENTER | Age: 54
DRG: 280 | End: 2019-03-24
Attending: STUDENT IN AN ORGANIZED HEALTH CARE EDUCATION/TRAINING PROGRAM
Payer: COMMERCIAL

## 2019-03-24 PROBLEM — R70.0 ELEVATED SEDIMENTATION RATE: Status: ACTIVE | Noted: 2019-03-20

## 2019-03-24 PROBLEM — I50.41 ACUTE COMBINED SYSTOLIC AND DIASTOLIC HEART FAILURE (HCC): Status: ACTIVE | Noted: 2019-03-24

## 2019-03-24 PROBLEM — I27.20 PULMONARY HYPERTENSION (HCC): Status: ACTIVE | Noted: 2019-03-24

## 2019-03-24 LAB
APPEARANCE UR: CLEAR
BILIRUB UR QL STRIP.AUTO: NEGATIVE
COLOR UR: YELLOW
DIGOXIN SERPL-MCNC: 0.5 NG/ML (ref 0.8–2)
ERYTHROCYTE [SEDIMENTATION RATE] IN BLOOD BY WESTERGREN METHOD: 39 MM/HOUR (ref 0–20)
GLUCOSE BLD-MCNC: 192 MG/DL (ref 65–99)
GLUCOSE BLD-MCNC: 220 MG/DL (ref 65–99)
GLUCOSE BLD-MCNC: 241 MG/DL (ref 65–99)
GLUCOSE BLD-MCNC: 284 MG/DL (ref 65–99)
GLUCOSE BLD-MCNC: 320 MG/DL (ref 65–99)
GLUCOSE UR STRIP.AUTO-MCNC: NEGATIVE MG/DL
KETONES UR STRIP.AUTO-MCNC: NEGATIVE MG/DL
LEUKOCYTE ESTERASE UR QL STRIP.AUTO: NEGATIVE
MICRO URNS: NORMAL
NITRITE UR QL STRIP.AUTO: NEGATIVE
PH UR STRIP.AUTO: 7.5 [PH]
PROT UR QL STRIP: NEGATIVE MG/DL
RBC UR QL AUTO: NEGATIVE
SP GR UR STRIP.AUTO: 1.01
UROBILINOGEN UR STRIP.AUTO-MCNC: 0.2 MG/DL

## 2019-03-24 PROCEDURE — 700102 HCHG RX REV CODE 250 W/ 637 OVERRIDE(OP): Performed by: STUDENT IN AN ORGANIZED HEALTH CARE EDUCATION/TRAINING PROGRAM

## 2019-03-24 PROCEDURE — 36415 COLL VENOUS BLD VENIPUNCTURE: CPT

## 2019-03-24 PROCEDURE — 82962 GLUCOSE BLOOD TEST: CPT | Mod: 91

## 2019-03-24 PROCEDURE — 97161 PT EVAL LOW COMPLEX 20 MIN: CPT

## 2019-03-24 PROCEDURE — 80048 BASIC METABOLIC PNL TOTAL CA: CPT

## 2019-03-24 PROCEDURE — 770020 HCHG ROOM/CARE - TELE (206)

## 2019-03-24 PROCEDURE — 99232 SBSQ HOSP IP/OBS MODERATE 35: CPT | Performed by: INTERNAL MEDICINE

## 2019-03-24 PROCEDURE — 700102 HCHG RX REV CODE 250 W/ 637 OVERRIDE(OP): Performed by: INTERNAL MEDICINE

## 2019-03-24 PROCEDURE — A9270 NON-COVERED ITEM OR SERVICE: HCPCS | Performed by: STUDENT IN AN ORGANIZED HEALTH CARE EDUCATION/TRAINING PROGRAM

## 2019-03-24 PROCEDURE — 99232 SBSQ HOSP IP/OBS MODERATE 35: CPT | Mod: GC | Performed by: INTERNAL MEDICINE

## 2019-03-24 PROCEDURE — 71046 X-RAY EXAM CHEST 2 VIEWS: CPT

## 2019-03-24 PROCEDURE — 80162 ASSAY OF DIGOXIN TOTAL: CPT

## 2019-03-24 PROCEDURE — 85027 COMPLETE CBC AUTOMATED: CPT

## 2019-03-24 PROCEDURE — 700102 HCHG RX REV CODE 250 W/ 637 OVERRIDE(OP): Performed by: HOSPITALIST

## 2019-03-24 PROCEDURE — 85652 RBC SED RATE AUTOMATED: CPT

## 2019-03-24 PROCEDURE — 84165 PROTEIN E-PHORESIS SERUM: CPT

## 2019-03-24 PROCEDURE — A9270 NON-COVERED ITEM OR SERVICE: HCPCS | Performed by: INTERNAL MEDICINE

## 2019-03-24 PROCEDURE — 700111 HCHG RX REV CODE 636 W/ 250 OVERRIDE (IP): Performed by: STUDENT IN AN ORGANIZED HEALTH CARE EDUCATION/TRAINING PROGRAM

## 2019-03-24 PROCEDURE — 81003 URINALYSIS AUTO W/O SCOPE: CPT

## 2019-03-24 PROCEDURE — A9540 TC99M MAA: HCPCS

## 2019-03-24 PROCEDURE — A9270 NON-COVERED ITEM OR SERVICE: HCPCS | Performed by: HOSPITALIST

## 2019-03-24 PROCEDURE — 84160 ASSAY OF PROTEIN ANY SOURCE: CPT

## 2019-03-24 RX ORDER — SPIRONOLACTONE 25 MG/1
25 TABLET ORAL
Status: DISCONTINUED | OUTPATIENT
Start: 2019-03-24 | End: 2019-03-26 | Stop reason: HOSPADM

## 2019-03-24 RX ORDER — CARVEDILOL 25 MG/1
25 TABLET ORAL 2 TIMES DAILY WITH MEALS
Status: DISCONTINUED | OUTPATIENT
Start: 2019-03-24 | End: 2019-03-26 | Stop reason: HOSPADM

## 2019-03-24 RX ORDER — HYDRALAZINE HYDROCHLORIDE 25 MG/1
25 TABLET, FILM COATED ORAL EVERY 8 HOURS
Status: DISCONTINUED | OUTPATIENT
Start: 2019-03-24 | End: 2019-03-25

## 2019-03-24 RX ORDER — DEXTROSE MONOHYDRATE 25 G/50ML
25 INJECTION, SOLUTION INTRAVENOUS
Status: DISCONTINUED | OUTPATIENT
Start: 2019-03-24 | End: 2019-03-26 | Stop reason: HOSPADM

## 2019-03-24 RX ORDER — ISOSORBIDE DINITRATE 10 MG/1
10 TABLET ORAL 3 TIMES DAILY
Status: DISCONTINUED | OUTPATIENT
Start: 2019-03-24 | End: 2019-03-25

## 2019-03-24 RX ORDER — HEPARIN SODIUM 5000 [USP'U]/ML
5000 INJECTION, SOLUTION INTRAVENOUS; SUBCUTANEOUS EVERY 8 HOURS
Status: DISCONTINUED | OUTPATIENT
Start: 2019-03-24 | End: 2019-03-26 | Stop reason: HOSPADM

## 2019-03-24 RX ADMIN — ISOSORBIDE DINITRATE 10 MG: 10 TABLET ORAL at 17:29

## 2019-03-24 RX ADMIN — CARVEDILOL 25 MG: 25 TABLET, FILM COATED ORAL at 17:20

## 2019-03-24 RX ADMIN — SPIRONOLACTONE 25 MG: 25 TABLET ORAL at 17:29

## 2019-03-24 RX ADMIN — ASPIRIN 81 MG: 81 TABLET, COATED ORAL at 05:08

## 2019-03-24 RX ADMIN — ATORVASTATIN CALCIUM 80 MG: 80 TABLET, FILM COATED ORAL at 17:20

## 2019-03-24 RX ADMIN — INSULIN HUMAN 1 UNITS: 100 INJECTION, SOLUTION PARENTERAL at 05:16

## 2019-03-24 RX ADMIN — HYDRALAZINE HYDROCHLORIDE 25 MG: 25 TABLET, FILM COATED ORAL at 20:30

## 2019-03-24 RX ADMIN — HYDRALAZINE HYDROCHLORIDE 25 MG: 25 TABLET, FILM COATED ORAL at 17:29

## 2019-03-24 RX ADMIN — FUROSEMIDE 40 MG: 10 INJECTION, SOLUTION INTRAMUSCULAR; INTRAVENOUS at 17:41

## 2019-03-24 RX ADMIN — CARVEDILOL 12.5 MG: 12.5 TABLET, FILM COATED ORAL at 05:08

## 2019-03-24 RX ADMIN — INSULIN HUMAN 2 UNITS: 100 INJECTION, SOLUTION PARENTERAL at 00:10

## 2019-03-24 RX ADMIN — INSULIN HUMAN 4 UNITS: 100 INJECTION, SOLUTION PARENTERAL at 11:18

## 2019-03-24 RX ADMIN — LOSARTAN POTASSIUM 50 MG: 50 TABLET ORAL at 17:20

## 2019-03-24 RX ADMIN — INSULIN HUMAN 2 UNITS: 100 INJECTION, SOLUTION PARENTERAL at 17:33

## 2019-03-24 RX ADMIN — CLOPIDOGREL BISULFATE 75 MG: 75 TABLET ORAL at 05:08

## 2019-03-24 RX ADMIN — LOSARTAN POTASSIUM 50 MG: 50 TABLET ORAL at 05:08

## 2019-03-24 RX ADMIN — FUROSEMIDE 40 MG: 10 INJECTION, SOLUTION INTRAMUSCULAR; INTRAVENOUS at 05:08

## 2019-03-24 ASSESSMENT — ENCOUNTER SYMPTOMS
BLOOD IN STOOL: 0
CONSTIPATION: 0
FLANK PAIN: 0
SPEECH CHANGE: 0
BACK PAIN: 0
DIZZINESS: 0
ABDOMINAL PAIN: 0
HEADACHES: 0
NAUSEA: 0
DIAPHORESIS: 0
LOSS OF CONSCIOUSNESS: 0
FALLS: 0
CLAUDICATION: 0
SHORTNESS OF BREATH: 1
BLURRED VISION: 0
FOCAL WEAKNESS: 0
PND: 0
CHILLS: 0
NECK PAIN: 0
DOUBLE VISION: 0
MYALGIAS: 0
VOMITING: 0
TINGLING: 0
WEAKNESS: 0
ORTHOPNEA: 0
SENSORY CHANGE: 0
DEPRESSION: 0
SEIZURES: 0
SPUTUM PRODUCTION: 0
HEARTBURN: 0
FEVER: 0
TREMORS: 0
COUGH: 0
HEMOPTYSIS: 0
DIARRHEA: 0
PALPITATIONS: 0

## 2019-03-24 ASSESSMENT — COGNITIVE AND FUNCTIONAL STATUS - GENERAL
MOBILITY SCORE: 24
SUGGESTED CMS G CODE MODIFIER MOBILITY: CH

## 2019-03-24 ASSESSMENT — LIFESTYLE VARIABLES: SUBSTANCE_ABUSE: 1

## 2019-03-24 ASSESSMENT — GAIT ASSESSMENTS
DISTANCE (FEET): 500
GAIT LEVEL OF ASSIST: SUPERVISED
DEVIATION: DECREASED HEEL STRIKE;DECREASED TOE OFF

## 2019-03-24 NOTE — ASSESSMENT & PLAN NOTE
Ejection fraction 40% with possible greater posterolateral hypokinesis but left anterior descending stenosis at the apex and a probable healing dissection in the proximal left anterior descending, recommended for medical therapy.  Presentation was predominantly right heart failure and significant pulmonary hypertension was noted, however very high LVEDP was noted as well.  Significantly elevated inflammatory markers also noted.  Recommended for amphetamine abstinence and dual antiplatelet therapy.

## 2019-03-24 NOTE — CARE PLAN
Problem: Bowel/Gastric:  Goal: Normal bowel function is maintained or improved  Outcome: PROGRESSING AS EXPECTED   03/23/19 2100   OTHER   Last BM 03/23/19       Problem: Knowledge Deficit  Goal: Knowledge of disease process/condition, treatment plan, diagnostic tests, and medications will improve  Outcome: PROGRESSING AS EXPECTED  Pt educated regarding activity, diet, meds and plan of care. Verbalized understanding.

## 2019-03-24 NOTE — PROGRESS NOTES
[facility] Medicine Daily Progress Note    Date of Service  3/23/2019    Chief Complaint  53 y.o. male admitted 3/20/2019 with chest pain    Hospital Course    Patient resented with chest pain to the emergency department on March 20, 2019.  Underlying history of type 2 diabetes mellitus, methamphetamine abuse, nicotine dependence, nonmedical compliance.  He was found to have   NSTEMI, cardiology consulted      Interval Problem Update  Patient seen and evaluated on rounds  Discussed with nursing staff on rounds  Shortness of breath improved  No chest pain  No cough today    Ongoing methamphetamine abuse prior to presentation  Ongoing nicotine dependence prior to presentation    Underlying morbid obesity  Denies any history of pulmonary problems  Some orthopnea, paroxysmal nocturnal dyspnea and persistent lower extremity edema at this time  No other complaints at this time      Consultants/Specialty  Cardiology    Code Status  Full code    Disposition  Continue telemetry monitoring  Anticipate discharge home once acute issues have resolved    Review of Systems  Review of Systems   Constitutional: Negative for chills, diaphoresis, fever and malaise/fatigue.   HENT: Negative for hearing loss.    Eyes: Negative for blurred vision and double vision.   Respiratory: Positive for shortness of breath. Negative for cough, hemoptysis and sputum production.    Cardiovascular: Positive for leg swelling. Negative for chest pain, palpitations, orthopnea, claudication and PND.   Gastrointestinal: Negative for abdominal pain, blood in stool, constipation, diarrhea, heartburn, melena, nausea and vomiting.   Genitourinary: Positive for frequency (Lasix related). Negative for dysuria, flank pain, hematuria and urgency.   Musculoskeletal: Negative for back pain, falls, joint pain, myalgias and neck pain.   Skin: Negative for itching and rash.   Neurological: Negative for dizziness, tingling, tremors, sensory change, speech change, focal  weakness, seizures, loss of consciousness, weakness and headaches.   Psychiatric/Behavioral: Positive for substance abuse. Negative for depression and suicidal ideas.        Physical Exam  Temp:  [36.3 °C (97.3 °F)-37.2 °C (98.9 °F)] 36.3 °C (97.3 °F)  Pulse:  [] 92  Resp:  [18-21] 20  BP: (133-170)/() 133/76  SpO2:  [92 %-100 %] 94 %    Physical Exam   Constitutional: He is oriented to person, place, and time. He appears well-developed and well-nourished. No distress.   Body mass index is 40.83 kg/m².   HENT:   Head: Normocephalic.   Mouth/Throat: Oropharynx is clear and moist. No oropharyngeal exudate.   Eyes: Pupils are equal, round, and reactive to light. Conjunctivae and EOM are normal. No scleral icterus.   Neck: Normal range of motion. No JVD present. No thyromegaly present.   Cardiovascular: Normal rate and regular rhythm.    Murmur heard.  Pulmonary/Chest: No stridor. No respiratory distress. He has no wheezes. He has rales.   Abdominal: Soft. Bowel sounds are normal. He exhibits no distension. There is no tenderness. There is no rebound and no guarding.   Musculoskeletal: He exhibits edema. He exhibits no tenderness or deformity.   Lymphadenopathy:     He has no cervical adenopathy.   Neurological: He is alert and oriented to person, place, and time. He has normal reflexes. No cranial nerve deficit.   Skin: Skin is warm and dry. He is not diaphoretic.   Psychiatric: He has a normal mood and affect. His behavior is normal. Judgment normal.       Fluids    Intake/Output Summary (Last 24 hours) at 03/23/19 1717  Last data filed at 03/23/19 0932   Gross per 24 hour   Intake              500 ml   Output             2475 ml   Net            -1975 ml       Laboratory  Recent Labs      03/20/19   2221  03/21/19   0346  03/22/19   0150   WBC  15.9*  15.0*  11.4*   RBC  4.73  4.53*  4.60*   HEMOGLOBIN  13.8*  13.1*  13.2*   HEMATOCRIT  42.5  41.0*  42.3   MCV  89.9  90.5  92.0   MCH  29.2  28.9  28.7    MCHC  32.5*  32.0*  31.2*   RDW  48.7  48.8  49.1   PLATELETCT  279  244  245   MPV  9.7  9.7  9.4     Recent Labs      03/21/19   0346  03/22/19   0150  03/23/19   0041   SODIUM  133*  141  136   POTASSIUM  4.7  4.4  4.1   CHLORIDE  101  106  104   CO2  27  27  27   GLUCOSE  254*  178*  209*   BUN  22 21  20   CREATININE  1.34  1.29  1.28   CALCIUM  8.7  8.5  8.2*     Recent Labs      03/20/19   2229   03/21/19   1201  03/22/19   0150  03/22/19   0816   APTT  46.1*   < >  78.3*  97.6*  118.0*   INR  1.23*   --    --   1.20*   --     < > = values in this interval not displayed.     Recent Labs      03/22/19   0150   BNPBTYPENAT  527*     Recent Labs      03/22/19   0150   TRIGLYCERIDE  74   HDL  26*   LDL  72       Imaging  EC-ECHOCARDIOGRAM COMPLETE W/ CONT   Final Result      DX-CHEST-LIMITED (1 VIEW)   Final Result      Stable chest findings compared with 3/20.               INTERPRETING LOCATION: [address] [place] [address], [facility], [facility]      DX-CHEST-PORTABLE (1 VIEW)   Final Result         1.  Pulmonary edema and/or infiltrates.   2.  Cardiomegaly      OUTSIDE IMAGES-CT CHEST   Final Result      OUTSIDE IMAGES-DX CHEST   Final Result      CL-LEFT HEART CATHETERIZATION WITH POSSIBLE INTERVENTION    (Results Pending)        Assessment/Plan  NSTEMI (non-ST elevated myocardial infarction) (HCC)- (present on admission)   Assessment & Plan    Coronary angiogram revealing coronary artery disease, no intervention done, medical management recommended    Echocardiogram with depressed EF of 40% and pulmonary hypertension    Aspirin 81, Plavix 75, high intensity statin therapy, carvedilol, ARB     Cough- (present on admission)   Assessment & Plan    This is secondary to pulmonary edema, cardiogenic  No infectious concerns apparent  Stop antibiotics     Bandemia- (present on admission)   Assessment & Plan    Stress related, monitor     Acute systolic congestive heart failure (HCC)- (present on admission)   Assessment & Plan    EF  40%  Maintain IV diuresis with Lasix  Monitoring renal function, electrolytes and I/os, daily weights with diuresis  Continue losartan  Carvedilol  Consider Aldactone over the coming days  We will continue close clinical monitoring     Essential hypertension- (present on admission)   Assessment & Plan    Continue afterload reduction     Dyslipidemia- (present on admission)   Assessment & Plan    Continue atorvastatin     Type 2 diabetes mellitus with hyperglycemia, without long-term current use of insulin ([facility])- (present on admission)   Assessment & Plan    Hemoglobin A1c 7.8, holding oral hypoglycemic agents  Sliding scale insulin No. 2     Class 3 severe obesity due to excess calories without serious comorbidity with body mass index (BMI) of 40.0 to 44.9 in adult (HCC)- (present on admission)   Assessment & Plan    Body mass index is 40.96 kg/m².     Normocytic anemia- (present on admission)   Assessment & Plan    Mild, no evidence of acute bleeding  Recommend further evaluation and management per PCP in the outpatient setting     Amphetamine abuse ([facility])- (present on admission)   Assessment & Plan    Discussed with patient on March 21, 2019  Discussed his positive urine toxicology  Patient in agreement, ongoing methamphetamine use prior to presentation  Reports that he smokes methamphetamine  Last used 2-3 days prior to presentation  Intermittently has persisted to use methamphetamines  Patient has been counseled and educated regarding cessation of drugs  Adverse effects of methamphetamines including cardiac discussed extensively with the patient  Benefits of cessation discussed with the patient          VTE prophylaxis: IV Heparin

## 2019-03-24 NOTE — ASSESSMENT & PLAN NOTE
Possibly hypertensive, possibly related to amphetamine use, focal coronary disease producing non-ST elevation myocardial infarction but not sufficient to produce global left ventricular dysfunction.

## 2019-03-24 NOTE — PROGRESS NOTES
Assumed care of pt, beside report received from FELCIIA Zhao. Updated on POC, call light within reach all fall precautions within place. Bed locked and lowered. Pt instructed to call for assistance before getting up. All questions answered, no other needs at this time.

## 2019-03-24 NOTE — ASSESSMENT & PLAN NOTE
RVSP=50 mmHg.  LVedP=38 mmHg.  Treating for left heart failure.  Scanning to rule out pulmonary embolus but avoiding CT contrast.

## 2019-03-24 NOTE — PROGRESS NOTES
"[facility] Cardiology Follow-up Consult Note    Date of note:    3/23/2019      Consulting Physician: Stefani Helms M.D.      Chief Complaint   Patient presents with   • Chest Pain     Interfacility transfer from [facility] for NSTEMI       HPI:  52 yo male with PMHx of HTN, HLD, tobacco use presents with 1-2 weeks of worsening dyspnea, orthopnea, PND, central chest pressure and 30 lb weight gain. Admitted and found to have elevated trop, urine + for amphetamines and symptoms suggestive of acute heart failure. Patient was transferred to [facility] for further evaluation with cardiac catheterization which revealed cardiomyopathy and CAD with proximal LAD plaque rupture and 100% terminal LAD occlusion not amenable to stenting       Interim Events:  - Tele: SR-ST , R PVC  - I & O: -8.5 L since admission   - Cardiac Cath yesterday ,\"Elevated LVEDP 38 mmHg.Cardiomyopathy left ventricular ejection fraction 36%.  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    Past medical, surgical, social, and family history reviewed and unchanged from admission except as noted in assessment and plan.    Medications: Reviewed in MAR  Current Facility-Administered Medications   Medication Dose Frequency Provider Last Rate Last Dose   • carvedilol (COREG) tablet 25 mg  25 mg BID WITH MEALS Magi Santos M.D. " "      • clopidogrel (PLAVIX) tablet 75 mg  75 mg DAILY Suzy Galvez M.D.   75 mg at 03/24/19 0508   • aspirin EC (ECOTRIN) tablet 81 mg  81 mg DAILY Stefani Helms M.D.   81 mg at 03/24/19 0508   • digoxin (LANOXIN) tablet 125 mcg  125 mcg DAILY AT 1800 Stefani Helms M.D.   125 mcg at 03/23/19 1734   • losartan (COZAAR) tablet 50 mg  50 mg BID Stefani Helms M.D.   50 mg at 03/24/19 0508   • cloNIDine (CATAPRES) tablet 0.1 mg  0.1 mg TID PRN Rickey Lock M.D.   0.1 mg at 03/22/19 2317   • polyethylene glycol/lytes (MIRALAX) PACKET 1 Packet  1 Packet BID Stefani Helms M.D.   1 Packet at 03/23/19 0541   • furosemide (LASIX) injection 40 mg  40 mg BID DIURETIC Magi Santos M.D.   40 mg at 03/24/19 0508   • acetaminophen (TYLENOL) tablet 650 mg  650 mg Q6HRS PRN Jean-Paul Dyson M.D.       • ondansetron (ZOFRAN) syringe/vial injection 4 mg  4 mg Q4HRS PRN Jean-Paul Dyson M.D.       • ondansetron (ZOFRAN ODT) dispertab 4 mg  4 mg Q4HRS PRN Jean-Paul Dyson M.D.       • morphine (pf) 4 mg/ml injection 2-4 mg  2-4 mg Q5 MIN PRN Jean-Paul Dyson M.D.       • nitroglycerin (NITROSTAT) tablet 0.4 mg  0.4 mg Q5 MIN PRN Jean-Paul Dyson M.D.       • atorvastatin (LIPITOR) tablet 80 mg  80 mg Q EVENING Jean-Paul Dyson M.D.   80 mg at 03/23/19 1734   • insulin regular (HUMULIN R) injection 1-6 Units  1-6 Units Q6HRS Jean-Paul Dyson M.D.   1 Units at 03/24/19 0516    And   • glucose 4 g chewable tablet 16 g  16 g Q15 MIN PRN Jean-Paul Dyson M.D.        And   • dextrose 50% (D50W) injection 25 mL  25 mL Q15 MIN PRN Jean-Paul Dyson M.D.         Last reviewed on 3/20/2019 11:07 PM by Sammie Smith PhT  No Known Allergies    Physical Exam  Body mass index is 38.52 kg/m². Blood pressure 137/80, pulse 94, temperature 36.9 °C (98.4 °F), temperature source Temporal, resp. rate 18, height 1.727 m (5' 8\"), weight 114.9 kg (253 lb 4.9 oz), SpO2 95 %.   Vitals:    03/23/19 2200 03/24/19 0100 03/24/19 0500 03/24/19 0900   BP:  138/85 124/81 " 137/80   Pulse:  87 92 94   Resp:  16 16 18   Temp:  36.4 °C (97.5 °F) 36.7 °C (98.1 °F) 36.9 °C (98.4 °F)   TempSrc:  Temporal Temporal Temporal   SpO2:  96% 94% 95%   Weight: 114.9 kg (253 lb 4.9 oz)      Height:        Oxygen Therapy:  Pulse Oximetry: 95 %, O2 Delivery: None (Room Air)    General: non-toxic appearing male, sitting up in chair eating, no acute distress   HEENT: NCAT, conjunctiva normal no, JVD   Lungs CTAB, no crackles, rale or rhonchi   Heart: RRR, no murmurs, no rubs or gallops no S3   EXT: 2+ pitting edema bilaterally. + pedal pulses. no cyanosis  Abdomen: soft, non tender, non distended  Neurological: A/O x 3. No focal sensory deficits  Skin: Warm extremities, slightly diaphoretic     Labs (personally reviewed and notable for):   Recent Results (from the past 24 hour(s))   ACCU-CHEK GLUCOSE    Collection Time: 03/23/19 11:59 AM   Result Value Ref Range    Glucose - Accu-Ck 331 (H) 65 - 99 mg/dL   ACCU-CHEK GLUCOSE    Collection Time: 03/23/19  5:38 PM   Result Value Ref Range    Glucose - Accu-Ck 191 (H) 65 - 99 mg/dL   ACCU-CHEK GLUCOSE    Collection Time: 03/24/19 12:06 AM   Result Value Ref Range    Glucose - Accu-Ck 220 (H) 65 - 99 mg/dL   ACCU-CHEK GLUCOSE    Collection Time: 03/24/19  5:13 AM   Result Value Ref Range    Glucose - Accu-Ck 192 (H) 65 - 99 mg/dL   URINALYSIS    Collection Time: 03/24/19  8:19 AM   Result Value Ref Range    Color Yellow     Character Clear     Specific Gravity 1.008 <1.035    Ph 7.5 5.0 - 8.0    Glucose Negative Negative mg/dL    Ketones Negative Negative mg/dL    Protein Negative Negative mg/dL    Bilirubin Negative Negative    Urobilinogen, Urine 0.2 Negative    Nitrite Negative Negative    Leukocyte Esterase Negative Negative    Occult Blood Negative Negative    Micro Urine Req see below        Cardiac Imaging and Procedures Review:   EKG- normal sinus rhythm, diffuse T wave changes no depression or elevation     Echocardiogram 3/22/2019  CONCLUSIONS  No  "prior study is available for comparison.   Technically difficult study.   Contrast was used to enhance definition of the endocardial borders.   Mildly reduced left ventricular systolic function.  Posterolateral wall hypokinessis.  Left ventricular ejection fraction is visually estimated to be 40%.  Upper normal left ventricular chamber size.  Mild mitral regurgitation.  Moderately dilated left atrium.  Structurally normal aortic valve without significant stenosis or   regurgitation.  Mild tricuspid regurgitation.  Dilated inferior vena cava with poor inspiratory collapse.  Estimated right ventricular systolic pressure  is 50 mmHg.  Mildly dilated right ventricle.  Normal right ventricular systolic function.  No pericardial effusion seen.    Cardiac Catheterization 3/23/2019  Hypertension  Elevated LVEDP 38 mmHg.  Cardiomyopathy left ventricular ejection fraction 36%.  Global hypokinesis with apical akinesis  Coronary artery disease proximal LAD proximal eccentric plaque rupture, terminal %, D1 ostial 50% stenosis      ASSESSMENT & PLAN    53-year-old male, suspect new onset heart failure with reduced ejection fraction and NSTEMI possible substance induced given + amphetamines in urine.       1. 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         2. Likely CHFrEF  - echo EF 35-40% , global hypokineses, suspect secondary to Meth use and underlying CAD   - continue losartain 25  - discontinue digoxin given EF 40%  - continue diuresis with 40 mg IV BID, recommend continuation of PO lasix on " "discharge   - pt will need outpatient follow up with cardiology and HF clinic      3. Pulmonary hypertension   - hx of tobacco use   - recommend VQ scan for further evaluation     4. Elevated ESR  - possible related to underlying cardiac situation vs infection   - recommend repeat ESR to evaluate for improvement   - will also order SPEP to rule out gammopathy given cardiomyopathy     5. T2DM  - glucose elevated  - hold metformin   - repeat Ha1c 7.8  - management per primary team       6. HTN  - stable, continue losartan and lasix  - cont. Amlodipine 10 mg  - increase metoprolol to 25 mg BID      7. HLD  - lipid panel within goal range    - cont. Statin     8. Substance use   - + urine for amphetamines, pt denies use, admits to hx of using   \"speed\"  - educate on importance of substance use cessation      9. Tobacco use   - educate on importance of smoking cessation     10. Cough   - resolved     Patient is stable for discharge if he is able to find transportation, however will continue to follow while in-patient     It is my pleasure to participate in the care of Mr. Hyatt.  Please do not hesitate to contact me with questions or concerns.    Magi Santos  PGY 2 [employer] [facility]   "

## 2019-03-24 NOTE — THERAPY
"Physical Therapy Cardiac Rehab Evaluation completed.   Gait: Level Of Assist: Supervised with No Equipment Needed       Plan of Care: Patient with no further skilled PT needs in the acute care setting at this time  Discharge Recommendations: Equipment: No Equipment Needed. Post-acute therapy Currently anticipate no further skilled therapy needs once patient is discharged from the inpatient setting.    See \"Rehab Therapy-Acute\" Patient Summary Report for complete documentation.    Patient is a 52 YO male that presented with complaints of worsening dyspnea, chest pressure, and 30# weight gain and is s/p NSTEMI with cath that revealed cardiomyopathy, CAD, proximal LAD rupture and LAD occlusion not amenable to stenting. EF per cath 36%. Patient with PMHx significant for HTN, HLD, tobacco use, amphetamine use, obesity, and poor diet. Patient able to tolerate 500ft of ambulation and ascended/descended two flights of stairs with supervision with no signs or symptoms of inappropriate hemodynamic response. Provided patient education regarding phase 1 cardiac rehab, home exercise program and appropriate activity progression, self monitoring via RPE scale/talk test/HR, and cardiac failure signs/symptoms, patient demonstrated understanding. No further acute PT needs.  "

## 2019-03-25 LAB
ANION GAP SERPL CALC-SCNC: 7 MMOL/L (ref 0–11.9)
BUN SERPL-MCNC: 24 MG/DL (ref 8–22)
CALCIUM SERPL-MCNC: 8.4 MG/DL (ref 8.5–10.5)
CHLORIDE SERPL-SCNC: 102 MMOL/L (ref 96–112)
CO2 SERPL-SCNC: 28 MMOL/L (ref 20–33)
CREAT SERPL-MCNC: 1.32 MG/DL (ref 0.5–1.4)
ERYTHROCYTE [DISTWIDTH] IN BLOOD BY AUTOMATED COUNT: 45.7 FL (ref 35.9–50)
GLUCOSE BLD-MCNC: 206 MG/DL (ref 65–99)
GLUCOSE BLD-MCNC: 239 MG/DL (ref 65–99)
GLUCOSE BLD-MCNC: 240 MG/DL (ref 65–99)
GLUCOSE BLD-MCNC: 366 MG/DL (ref 65–99)
GLUCOSE SERPL-MCNC: 324 MG/DL (ref 65–99)
HCT VFR BLD AUTO: 39.4 % (ref 42–52)
HGB BLD-MCNC: 12.9 G/DL (ref 14–18)
MCH RBC QN AUTO: 29.2 PG (ref 27–33)
MCHC RBC AUTO-ENTMCNC: 32.7 G/DL (ref 33.7–35.3)
MCV RBC AUTO: 89.1 FL (ref 81.4–97.8)
PLATELET # BLD AUTO: 250 K/UL (ref 164–446)
PMV BLD AUTO: 10 FL (ref 9–12.9)
POTASSIUM SERPL-SCNC: 3.7 MMOL/L (ref 3.6–5.5)
RBC # BLD AUTO: 4.42 M/UL (ref 4.7–6.1)
SODIUM SERPL-SCNC: 137 MMOL/L (ref 135–145)
WBC # BLD AUTO: 9.5 K/UL (ref 4.8–10.8)

## 2019-03-25 PROCEDURE — 99233 SBSQ HOSP IP/OBS HIGH 50: CPT | Mod: GC | Performed by: INTERNAL MEDICINE

## 2019-03-25 PROCEDURE — A9270 NON-COVERED ITEM OR SERVICE: HCPCS | Performed by: STUDENT IN AN ORGANIZED HEALTH CARE EDUCATION/TRAINING PROGRAM

## 2019-03-25 PROCEDURE — 99232 SBSQ HOSP IP/OBS MODERATE 35: CPT | Performed by: INTERNAL MEDICINE

## 2019-03-25 PROCEDURE — 700102 HCHG RX REV CODE 250 W/ 637 OVERRIDE(OP): Performed by: INTERNAL MEDICINE

## 2019-03-25 PROCEDURE — A9270 NON-COVERED ITEM OR SERVICE: HCPCS | Performed by: INTERNAL MEDICINE

## 2019-03-25 PROCEDURE — 82962 GLUCOSE BLOOD TEST: CPT | Mod: 91

## 2019-03-25 PROCEDURE — 770020 HCHG ROOM/CARE - TELE (206)

## 2019-03-25 PROCEDURE — 700102 HCHG RX REV CODE 250 W/ 637 OVERRIDE(OP): Performed by: HOSPITALIST

## 2019-03-25 PROCEDURE — A9270 NON-COVERED ITEM OR SERVICE: HCPCS | Performed by: HOSPITALIST

## 2019-03-25 PROCEDURE — 700102 HCHG RX REV CODE 250 W/ 637 OVERRIDE(OP): Performed by: STUDENT IN AN ORGANIZED HEALTH CARE EDUCATION/TRAINING PROGRAM

## 2019-03-25 PROCEDURE — 700111 HCHG RX REV CODE 636 W/ 250 OVERRIDE (IP): Performed by: STUDENT IN AN ORGANIZED HEALTH CARE EDUCATION/TRAINING PROGRAM

## 2019-03-25 RX ORDER — HYDRALAZINE HYDROCHLORIDE 50 MG/1
50 TABLET, FILM COATED ORAL EVERY 8 HOURS
Status: DISCONTINUED | OUTPATIENT
Start: 2019-03-25 | End: 2019-03-26 | Stop reason: HOSPADM

## 2019-03-25 RX ORDER — FUROSEMIDE 40 MG/1
40 TABLET ORAL DAILY
Qty: 30 TAB | Refills: 0 | Status: SHIPPED | OUTPATIENT
Start: 2019-03-26

## 2019-03-25 RX ORDER — FUROSEMIDE 40 MG/1
40 TABLET ORAL
Status: DISCONTINUED | OUTPATIENT
Start: 2019-03-26 | End: 2019-03-26 | Stop reason: HOSPADM

## 2019-03-25 RX ORDER — LOSARTAN POTASSIUM 100 MG/1
100 TABLET ORAL DAILY
Qty: 30 TAB | Refills: 0 | Status: SHIPPED | OUTPATIENT
Start: 2019-03-25

## 2019-03-25 RX ORDER — ISOSORBIDE DINITRATE 10 MG/1
20 TABLET ORAL EVERY 8 HOURS
Status: DISCONTINUED | OUTPATIENT
Start: 2019-03-25 | End: 2019-03-26 | Stop reason: HOSPADM

## 2019-03-25 RX ORDER — POTASSIUM CHLORIDE 20 MEQ/1
40 TABLET, EXTENDED RELEASE ORAL ONCE
Status: COMPLETED | OUTPATIENT
Start: 2019-03-25 | End: 2019-03-25

## 2019-03-25 RX ORDER — CLOPIDOGREL BISULFATE 75 MG/1
75 TABLET ORAL DAILY
Qty: 30 TAB | Refills: 0 | Status: SHIPPED | OUTPATIENT
Start: 2019-03-26

## 2019-03-25 RX ORDER — ATORVASTATIN CALCIUM 80 MG/1
80 TABLET, FILM COATED ORAL EVERY EVENING
Qty: 30 TAB | Refills: 0 | Status: SHIPPED | OUTPATIENT
Start: 2019-03-25

## 2019-03-25 RX ORDER — CARVEDILOL 25 MG/1
25 TABLET ORAL 2 TIMES DAILY WITH MEALS
Qty: 60 TAB | Refills: 0 | Status: SHIPPED | OUTPATIENT
Start: 2019-03-25

## 2019-03-25 RX ORDER — ISOSORBIDE DINITRATE 20 MG/1
20 TABLET ORAL EVERY 8 HOURS
Qty: 90 TAB | Refills: 0 | Status: SHIPPED | OUTPATIENT
Start: 2019-03-25

## 2019-03-25 RX ORDER — ASPIRIN 81 MG/1
81 TABLET ORAL DAILY
Qty: 30 TAB | Refills: 0 | Status: SHIPPED | OUTPATIENT
Start: 2019-03-26

## 2019-03-25 RX ORDER — SPIRONOLACTONE 25 MG/1
25 TABLET ORAL DAILY
Qty: 30 TAB | Refills: 0 | Status: SHIPPED | OUTPATIENT
Start: 2019-03-26

## 2019-03-25 RX ORDER — HYDRALAZINE HYDROCHLORIDE 50 MG/1
50 TABLET, FILM COATED ORAL EVERY 8 HOURS
Qty: 90 TAB | Refills: 0 | Status: SHIPPED | OUTPATIENT
Start: 2019-03-25

## 2019-03-25 RX ADMIN — FUROSEMIDE 40 MG: 10 INJECTION, SOLUTION INTRAMUSCULAR; INTRAVENOUS at 05:42

## 2019-03-25 RX ADMIN — ISOSORBIDE DINITRATE 20 MG: 10 TABLET ORAL at 21:02

## 2019-03-25 RX ADMIN — ISOSORBIDE DINITRATE 10 MG: 10 TABLET ORAL at 05:41

## 2019-03-25 RX ADMIN — ATORVASTATIN CALCIUM 80 MG: 80 TABLET, FILM COATED ORAL at 16:04

## 2019-03-25 RX ADMIN — SPIRONOLACTONE 25 MG: 25 TABLET ORAL at 05:41

## 2019-03-25 RX ADMIN — HYDRALAZINE HYDROCHLORIDE 50 MG: 50 TABLET, FILM COATED ORAL at 13:49

## 2019-03-25 RX ADMIN — METFORMIN HYDROCHLORIDE 1000 MG: 500 TABLET, FILM COATED ORAL at 16:04

## 2019-03-25 RX ADMIN — HYDRALAZINE HYDROCHLORIDE 25 MG: 25 TABLET, FILM COATED ORAL at 05:42

## 2019-03-25 RX ADMIN — POTASSIUM CHLORIDE 40 MEQ: 1500 TABLET, EXTENDED RELEASE ORAL at 21:01

## 2019-03-25 RX ADMIN — LOSARTAN POTASSIUM 50 MG: 50 TABLET ORAL at 16:04

## 2019-03-25 RX ADMIN — CARVEDILOL 25 MG: 25 TABLET, FILM COATED ORAL at 05:41

## 2019-03-25 RX ADMIN — CLOPIDOGREL BISULFATE 75 MG: 75 TABLET ORAL at 05:42

## 2019-03-25 RX ADMIN — ASPIRIN 81 MG: 81 TABLET, COATED ORAL at 05:42

## 2019-03-25 RX ADMIN — LOSARTAN POTASSIUM 50 MG: 50 TABLET ORAL at 05:41

## 2019-03-25 RX ADMIN — CARVEDILOL 25 MG: 25 TABLET, FILM COATED ORAL at 16:04

## 2019-03-25 RX ADMIN — HYDRALAZINE HYDROCHLORIDE 50 MG: 50 TABLET, FILM COATED ORAL at 21:02

## 2019-03-25 RX ADMIN — ISOSORBIDE DINITRATE 20 MG: 10 TABLET ORAL at 13:49

## 2019-03-25 ASSESSMENT — ENCOUNTER SYMPTOMS
DIARRHEA: 0
PND: 0
PALPITATIONS: 0
SENSORY CHANGE: 0
FOCAL WEAKNESS: 0
DIZZINESS: 0
DEPRESSION: 0
DIAPHORESIS: 0
FEVER: 0
VOMITING: 0
TINGLING: 0
FLANK PAIN: 0
NAUSEA: 0
LOSS OF CONSCIOUSNESS: 0
TREMORS: 0
FALLS: 0
COUGH: 0
HEADACHES: 0
CHILLS: 0
CONSTIPATION: 0
SPEECH CHANGE: 0
SHORTNESS OF BREATH: 1
SEIZURES: 0
WEAKNESS: 0
ABDOMINAL PAIN: 0
BACK PAIN: 0
CLAUDICATION: 0
BLOOD IN STOOL: 0
MYALGIAS: 0
DOUBLE VISION: 0
HEMOPTYSIS: 0
SPUTUM PRODUCTION: 0
HEARTBURN: 0
BLURRED VISION: 0
NECK PAIN: 0
ORTHOPNEA: 0

## 2019-03-25 ASSESSMENT — LIFESTYLE VARIABLES: SUBSTANCE_ABUSE: 1

## 2019-03-25 NOTE — CARE PLAN
Problem: Venous Thromboembolism (VTW)/Deep Vein Thrombosis (DVT) Prevention:  Goal: Patient will participate in Venous Thrombosis (VTE)/Deep Vein Thrombosis (DVT)Prevention Measures  Pt will participate in at least one preventative measure.     Problem: Fluid Volume:  Goal: Will maintain balanced intake and output  Outcome: PROGRESSING AS EXPECTED  Pt will continue to use urinal this shift

## 2019-03-25 NOTE — DISCHARGE PLANNING
This SW intern spoke with pt about getting him back to [place] and pt stated that he has no family or friends who can transport him back home. Pt also stated that he is currently unemployed but is not receiving any income but receives food stamps to help with groceries. Pt said he is staying with friends in [place] but could not provide a physical address. This intern informed him that unless he provided a physical address, we could not assist in transporting him back home.

## 2019-03-25 NOTE — DISCHARGE PLANNING
Anticipated Discharge Disposition: [place]    Action: LSW was informed by MD that there are multiple medications that pt needs to d/c on and is unable to afford them. Pt will also need a ride back to [place]. SW Intern found ticket through [employer] for $59 dollars leaving at 1:10pm on 3/26/19. LSW faxed over list of medications to [facility] and received call back with approved services amount:    Isosorbide 20mg- $23.38  Atorvastatin 80mg- $9.48  hydrALAZINE 50mg- $8.93  metFORMIN 1000mg- $8.03  spironolactone 25mg- $8.07  clopidogrel 75mg- $8.58  carvedilol 25mg- $8.30  Losartan 100mg- $9.02  furosemide 40mg- $7.69  aspirin 81mg- $9.10    Total amount for medication is $100.58. LSW completed approved services form and is sitting on LSW desk. LSW was informed that pt was wanting a cigarette and thinking of leaving. LSW met with pt at bedside and pt appeared very agitated and stated that he was wanting to smoke and did not want to stay. LSW explained thst LSW can not assist with medications or ride back home if pt chooses to leave A. Pt stated that he will stay one more night.     LSW decided not to fill medications or book train bus ticket to [place] until tomorrow morning when pt can prove that he's willing to stay overnight.     Barriers to Discharge: uninsured    Plan: If pt stays overnight, train ticket (59 dollars) through [facility] [employer] will need to be purchased leaving [place] at 1:10pm and approved services will need to be faxed to [facility] and medications picked up. LSW to leave report for tabitha PRITCHETT.

## 2019-03-25 NOTE — DISCHARGE PLANNING
"Physical address for pt in [place] is [address] [place] [id number]    Anticipated Discharge Disposition: Pt is deciding whether to go to his brother's home in [place] or take a bus through [facility] back to [place].     Action: This SW intern met with pt regarding transporting him back to [place]. Pt stated that he has not worked in several years and when asked if that was due to a disability or choice he did not respond and replied back with \"Can't work like this now.\" When this intern asked how long he had been living with friends he stated \"several years.\" This intern asked about family and friends and the pt stated that the closest relative he had was his brother who lived in [place] but could not take time off work to help transport him. All of his family and friends live in [place]. Pt stated that brother offered to pay for his ticket back to [place] if need be. This intern looked into [employer] which does not service the [place] or [place] area, but found that [facility] transports from [place] to [place] for $59. This intern offered the option of either getting him to his brother's in [place] or waiting until the following day for [place]. Pt became agitated and said that he does not want to be here and wants to smoke a cigarette outside. This intern informed him that that is highly not recommended and if he left it would be AMA. Pt was unable to get ahold of brother and was informed that the focus would then be on the bus transport.     Barriers to Discharge: Transportation. Pt does not currently have the 10 medications prescribed to him and is wanting to leave AMA.    Plan: Pt agreed to stay overnight and medications and transport will be set up for tomorrow.  "

## 2019-03-25 NOTE — PROGRESS NOTES
Report received from FELICIA Renee. Updated on the patients plan of care. Patient is A/O x4 and resting in bed. Patient is up self. Call light is within reach, patient has no other needs at this time.

## 2019-03-25 NOTE — PROGRESS NOTES
Hospital Medicine Daily Progress Note    Date of Service  3/24/2019    Chief Complaint  53 y.o. male admitted 3/20/2019 with chest pain    Hospital Course    Patient resented with chest pain to the emergency department on March 20, 2019.  Underlying history of type 2 diabetes mellitus, methamphetamine abuse, nicotine dependence, nonmedical compliance.  He was found to have   NSTEMI, cardiology consulted      Interval Problem Update  Patient seen and evaluated on rounds  Discussed with nursing staff on rounds  Shortness of breath improved  No chest pain  No cough today    Ongoing methamphetamine abuse prior to presentation  Ongoing nicotine dependence prior to presentation    Underlying morbid obesity  Denies any history of pulmonary problems  Some orthopnea, paroxysmal nocturnal dyspnea and persistent lower extremity edema at this time  No other complaints at this time    Eager to discharge home    Continue afterload reduction     Cardiology following       Consultants/Specialty  Cardiology    Code Status  Full code    Disposition  Continue telemetry monitoring  Anticipate discharge home once acute issues have resolved    Review of Systems  Review of Systems   Constitutional: Negative for chills, diaphoresis, fever and malaise/fatigue.   HENT: Negative for hearing loss.    Eyes: Negative for blurred vision and double vision.   Respiratory: Positive for shortness of breath. Negative for cough, hemoptysis and sputum production.    Cardiovascular: Positive for leg swelling. Negative for chest pain, palpitations, orthopnea, claudication and PND.   Gastrointestinal: Negative for abdominal pain, blood in stool, constipation, diarrhea, heartburn, melena, nausea and vomiting.   Genitourinary: Positive for frequency (Lasix related). Negative for dysuria, flank pain, hematuria and urgency.   Musculoskeletal: Negative for back pain, falls, joint pain, myalgias and neck pain.   Skin: Negative for itching and rash.    Neurological: Negative for dizziness, tingling, tremors, sensory change, speech change, focal weakness, seizures, loss of consciousness, weakness and headaches.   Psychiatric/Behavioral: Positive for substance abuse. Negative for depression and suicidal ideas.        Physical Exam  Temp:  [36.4 °C (97.5 °F)-37 °C (98.6 °F)] 37 °C (98.6 °F)  Pulse:  [] 93  Resp:  [16-18] 18  BP: (124-153)/(80-93) 153/93  SpO2:  [93 %-96 %] 94 %    Physical Exam   Constitutional: He is oriented to person, place, and time. He appears well-developed and well-nourished. No distress.   Body mass index is 40.83 kg/m².   HENT:   Head: Normocephalic.   Mouth/Throat: Oropharynx is clear and moist. No oropharyngeal exudate.   Eyes: Pupils are equal, round, and reactive to light. Conjunctivae and EOM are normal. No scleral icterus.   Neck: Normal range of motion. No JVD present. No thyromegaly present.   Cardiovascular: Normal rate and regular rhythm.    Murmur heard.  Pulmonary/Chest: No stridor. No respiratory distress. He has no wheezes. He has rales.   Abdominal: Soft. Bowel sounds are normal. He exhibits no distension. There is no tenderness. There is no rebound and no guarding.   Musculoskeletal: He exhibits edema. He exhibits no tenderness or deformity.   Lymphadenopathy:     He has no cervical adenopathy.   Neurological: He is alert and oriented to person, place, and time. He has normal reflexes. No cranial nerve deficit.   Skin: Skin is warm and dry. He is not diaphoretic.   Psychiatric: He has a normal mood and affect. His behavior is normal. Judgment normal.       Fluids    Intake/Output Summary (Last 24 hours) at 03/24/19 1713  Last data filed at 03/24/19 1400   Gross per 24 hour   Intake              240 ml   Output             2725 ml   Net            -2485 ml       Laboratory  Recent Labs      03/22/19   0150   WBC  11.4*   RBC  4.60*   HEMOGLOBIN  13.2*   HEMATOCRIT  42.3   MCV  92.0   MCH  28.7   MCHC  31.2*   RDW  49.1    PLATELETCT  245   MPV  9.4     Recent Labs      03/22/19   0150  03/23/19   0041   SODIUM  141  136   POTASSIUM  4.4  4.1   CHLORIDE  106  104   CO2  27  27   GLUCOSE  178*  209*   BUN  21  20   CREATININE  1.29  1.28   CALCIUM  8.5  8.2*     Recent Labs      03/22/19   0150  03/22/19   0816   APTT  97.6*  118.0*   INR  1.20*   --      Recent Labs      03/22/19   0150   BNPBTYPENAT  527*     Recent Labs      03/22/19   0150   TRIGLYCERIDE  74   HDL  26*   LDL  72       Imaging  NM-LUNG VENT/PERF IMAGING   Final Result      Low probability for pulmonary embolus.      DX-CHEST-2 VIEWS   Final Result      Small right pleural effusion.      Mild cardiomegaly.         EC-ECHOCARDIOGRAM COMPLETE W/ CONT   Final Result      DX-CHEST-LIMITED (1 VIEW)   Final Result      Stable chest findings compared with 3/20.               INTERPRETING LOCATION: [address] [place] [address], [facility], [facility]      DX-CHEST-PORTABLE (1 VIEW)   Final Result         1.  Pulmonary edema and/or infiltrates.   2.  Cardiomegaly      OUTSIDE IMAGES-CT CHEST   Final Result      OUTSIDE IMAGES-DX CHEST   Final Result      CL-LEFT HEART CATHETERIZATION WITH POSSIBLE INTERVENTION    (Results Pending)        Assessment/Plan  * NSTEMI (non-ST elevated myocardial infarction) (HCC)- (present on admission)   Assessment & Plan    Coronary angiogram revealing coronary artery disease, no intervention done, medical management recommended    Echocardiogram with depressed EF of 40% and pulmonary hypertension    Aspirin 81, Plavix 75, high intensity statin therapy, carvedilol, ARB, Aldactone, Isordil, hydralazine     Cough- (present on admission)   Assessment & Plan    This is secondary to pulmonary edema, cardiogenic  No infectious concerns apparent  Stop antibiotics     Bandemia- (present on admission)   Assessment & Plan    Stress related, monitor     Essential hypertension- (present on admission)   Assessment & Plan    Continue afterload reduction     Dyslipidemia- (present  on admission)   Assessment & Plan    Continue atorvastatin     Type 2 diabetes mellitus with hyperglycemia, without long-term current use of insulin ([facility])- (present on admission)   Assessment & Plan    Hemoglobin A1c 7.8, holding oral hypoglycemic agents  Sliding scale insulin No. 2     Class 3 severe obesity due to excess calories without serious comorbidity with body mass index (BMI) of 40.0 to 44.9 in adult ([facility])- (present on admission)   Assessment & Plan    Body mass index is 40.96 kg/m².     Normocytic anemia- (present on admission)   Assessment & Plan    Mild, no evidence of acute bleeding  Recommend further evaluation and management per PCP in the outpatient setting     Amphetamine abuse ([facility])- (present on admission)   Assessment & Plan    Discussed with patient on March 21, 2019  Discussed his positive urine toxicology  Patient in agreement, ongoing methamphetamine use prior to presentation  Reports that he smokes methamphetamine  Last used 2-3 days prior to presentation  Intermittently has persisted to use methamphetamines  Patient has been counseled and educated regarding cessation of drugs  Adverse effects of methamphetamines including cardiac discussed extensively with the patient  Benefits of cessation discussed with the patient          VTE prophylaxis: [place] Heparin

## 2019-03-25 NOTE — PROGRESS NOTES
"[facility] Cardiology Follow-up Consult Note    Date of note:    3/23/2019      Consulting Physician: Stefani Helms M.D.      Chief Complaint   Patient presents with   • Chest Pain     Interfacility transfer from [facility] for NSTEMI       HPI:  52 yo male with PMHx of HTN, HLD, tobacco use presents with 1-2 weeks of worsening dyspnea, orthopnea, PND, central chest pressure and 30 lb weight gain. Admitted and found to have elevated trop, urine + for amphetamines and symptoms suggestive of acute heart failure. Patient was transferred to [facility] for further evaluation with cardiac catheterization which revealed cardiomyopathy and CAD with proximal LAD plaque rupture and 100% terminal LAD occlusion not amenable to stenting       Interim Events:  - Tele: SR [id number], Rare PVC/Couplet  - I & O: -11.2 L since admission   - Cardiac Cath ,\"Elevated LVEDP 38 mmHg.Cardiomyopathy left ventricular ejection fraction 36%.  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    Past medical, surgical, social, and family history reviewed and unchanged from admission except as noted in assessment and plan.    Medications: Reviewed in MAR  Current Facility-Administered Medications   Medication Dose Frequency Provider Last Rate Last Dose   • pneumococcal vaccine " (PNEUMOVAX-23) injection 25 mcg  0.5 mL Once PRN Stefani Helms M.D.       • isosorbide dinitrate (ISORDIL) tablet 20 mg  20 mg Q8HRS Stefani Helms M.D.       • hydrALAZINE (APRESOLINE) tablet 50 mg  50 mg Q8HRS Stefani Helms M.D.       • furosemide (LASIX) tablet 40 mg  40 mg Q DAY Magi Santos M.D.       • carvedilol (COREG) tablet 25 mg  25 mg BID WITH MEALS Magi Santos M.D.   25 mg at 03/25/19 0541   • spironolactone (ALDACTONE) tablet 25 mg  25 mg Q DAY Stefani Helms M.D.   25 mg at 03/25/19 0541   • heparin injection 5,000 Units  5,000 Units Q8HRS Stefani Helms M.D.       • insulin regular (HUMULIN R) injection 1-6 Units  1-6 Units 4X/DAY [facility]   5 Units at 03/25/19 1057    And   • glucose 4 g chewable tablet 16 g  16 g Q15 MIN PRN Stefani Helms M.D.        And   • dextrose 50% (D50W) injection 25 mL  25 mL Q15 MIN PRN Stefani Helms M.D.       • clopidogrel (PLAVIX) tablet 75 mg  75 mg DAILY Suzy Galvez M.D.   75 mg at 03/25/19 0542   • aspirin EC (ECOTRIN) tablet 81 mg  81 mg DAILY Stefani Helms M.D.   81 mg at 03/25/19 0542   • losartan (COZAAR) tablet 50 mg  50 mg BID Stefani Helms M.D.   50 mg at 03/25/19 0541   • cloNIDine (CATAPRES) tablet 0.1 mg  0.1 mg TID PRN Rickey Lock M.D.   0.1 mg at 03/22/19 2317   • polyethylene glycol/lytes (MIRALAX) PACKET 1 Packet  1 Packet BID Stefani Helms M.D.   1 Packet at 03/23/19 0541   • acetaminophen (TYLENOL) tablet 650 mg  650 mg Q6HRS PRN Jean-Paul Dyson M.D.       • ondansetron (ZOFRAN) syringe/vial injection 4 mg  4 mg Q4HRS PRN Jean-Paul Dyson M.D.       • ondansetron (ZOFRAN ODT) dispertab 4 mg  4 mg Q4HRS PRN Jean-Paul Dyson M.D.       • morphine (pf) 4 mg/ml injection 2-4 mg  2-4 mg Q5 MIN PRN Jean-Paul Dyson M.D.       • nitroglycerin (NITROSTAT) tablet 0.4 mg  0.4 mg Q5 MIN PRN Jean-Paul Dyson M.D.       • atorvastatin (LIPITOR) tablet 80 mg  80 mg Q EVENING Jean-Paul Dyson M.D.   80 mg at 03/24/19 1720     Last reviewed on  "3/20/2019 11:07 PM by Clariebl Nixon  No Known Allergies    Physical Exam  Body mass index is 37.81 kg/m². Blood pressure 128/74, pulse 81, temperature 36.6 °C (97.9 °F), temperature source Temporal, resp. rate 16, height 1.727 m (5' 8\"), weight 112.8 kg (248 lb 10.9 oz), SpO2 91 %.   Vitals:    03/24/19 2035 03/25/19 0035 03/25/19 0500 03/25/19 0844   BP: 146/82 131/74 141/91 128/74   Pulse: 82 89 88 81   Resp: 16 20 20 16   Temp: 36.1 °C (97 °F) 36.9 °C (98.4 °F) 37.3 °C (99.1 °F) 36.6 °C (97.9 °F)   TempSrc: Temporal Temporal Temporal Temporal   SpO2:  95% 95% 91%   Weight:       Height:        Oxygen Therapy:  Pulse Oximetry: 91 %, O2 (LPM): 0, O2 Delivery: None (Room Air)    General: non-toxic appearing male, sitting up in chair eating, no acute distress   HEENT: NCAT, conjunctiva normal no, JVD   Lungs CTAB, no crackles, rale or rhonchi   Heart: RRR, no murmurs, no rubs or gallops no S3   EXT: 2+ pitting edema bilaterally. + pedal pulses. no cyanosis  Abdomen: soft, non tender, non distended  Neurological: A/O x 3. No focal sensory deficits  Skin: Warm extremities, slightly diaphoretic     Labs (personally reviewed and notable for):   Recent Results (from the past 24 hour(s))   ACCU-CHEK GLUCOSE    Collection Time: 03/24/19 11:17 AM   Result Value Ref Range    Glucose - Accu-Ck 320 (H) 65 - 99 mg/dL   DIGOXIN    Collection Time: 03/24/19 12:11 PM   Result Value Ref Range    Digoxin 0.5 (L) 0.8 - 2.0 ng/mL   WESTERGREN SED RATE    Collection Time: 03/24/19 12:11 PM   Result Value Ref Range    Sed Rate Westergren 39 (H) 0 - 20 mm/hour   ACCU-CHEK GLUCOSE    Collection Time: 03/24/19  5:19 PM   Result Value Ref Range    Glucose - Accu-Ck 241 (H) 65 - 99 mg/dL   ACCU-CHEK GLUCOSE    Collection Time: 03/24/19  8:31 PM   Result Value Ref Range    Glucose - Accu-Ck 284 (H) 65 - 99 mg/dL   CBC WITHOUT DIFFERENTIAL    Collection Time: 03/24/19 11:45 PM   Result Value Ref Range    WBC 9.5 4.8 - 10.8 K/uL    RBC 4.42 " (L) 4.70 - 6.10 M/uL    Hemoglobin 12.9 (L) 14.0 - 18.0 g/dL    Hematocrit 39.4 (L) 42.0 - 52.0 %    MCV 89.1 81.4 - 97.8 fL    MCH 29.2 27.0 - 33.0 pg    MCHC 32.7 (L) 33.7 - 35.3 g/dL    RDW 45.7 35.9 - 50.0 fL    Platelet Count 250 164 - 446 K/uL    MPV 10.0 9.0 - 12.9 fL   Basic Metabolic Panel    Collection Time: 03/24/19 11:45 PM   Result Value Ref Range    Sodium 137 135 - 145 mmol/L    Potassium 3.7 3.6 - 5.5 mmol/L    Chloride 102 96 - 112 mmol/L    Co2 28 20 - 33 mmol/L    Glucose 324 (H) 65 - 99 mg/dL    Bun 24 (H) 8 - 22 mg/dL    Creatinine 1.32 0.50 - 1.40 mg/dL    Calcium 8.4 (L) 8.5 - 10.5 mg/dL    Anion Gap 7.0 0.0 - 11.9   ESTIMATED GFR    Collection Time: 03/24/19 11:45 PM   Result Value Ref Range    GFR If African American >60 >60 mL/min/1.73 m 2    GFR If Non  57 (A) >60 mL/min/1.73 m 2   ACCU-CHEK GLUCOSE    Collection Time: 03/25/19  5:33 AM   Result Value Ref Range    Glucose - Accu-Ck 240 (H) 65 - 99 mg/dL       Cardiac Imaging and Procedures Review:   EKG- normal sinus rhythm, diffuse T wave changes no depression or elevation     Echocardiogram 3/22/2019  CONCLUSIONS  No prior study is available for comparison.   Technically difficult study.   Contrast was used to enhance definition of the endocardial borders.   Mildly reduced left ventricular systolic function.  Posterolateral wall hypokinessis.  Left ventricular ejection fraction is visually estimated to be 40%.  Upper normal left ventricular chamber size.  Mild mitral regurgitation.  Moderately dilated left atrium.  Structurally normal aortic valve without significant stenosis or   regurgitation.  Mild tricuspid regurgitation.  Dilated inferior vena cava with poor inspiratory collapse.  Estimated right ventricular systolic pressure  is 50 mmHg.  Mildly dilated right ventricle.  Normal right ventricular systolic function.  No pericardial effusion seen.    Cardiac Catheterization 3/23/2019  Hypertension  Elevated LVEDP 38  "mmHg.  Cardiomyopathy left ventricular ejection fraction 36%.  Global hypokinesis with apical akinesis  Coronary artery disease proximal LAD proximal eccentric plaque rupture, terminal %, D1 ostial 50% stenosis      ASSESSMENT & PLAN    53-year-old male, suspect new onset heart failure with reduced ejection fraction and NSTEMI possible substance induced given + amphetamines in urine.       1. 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         2. Likely CHFrEF  - echo EF 35-40% , global hypokineses, suspect secondary to Meth use and underlying CAD   - continue losartain 25  - transition IV lasix to PO   - pt will need outpatient follow up with cardiology and HF clinic      3. Pulmonary hypertension   - hx of tobacco use and current meth use   - VQ scan suggest low probability of PE  - recommend outpatient workup     4. Elevated ESR  - possible related to underlying cardiac situation vs infection   - ESR trending down     5. T2DM  - glucose elevated  - hold metformin   - repeat Ha1c 7.8  - management per primary team       6. HTN  - stable, continue losartan and lasix  - cont. Amlodipine 10 mg  - cont. metoprolol to 25 mg BID, losartan 50 mg BID, aldactone 25 mg     7. HLD  - lipid panel within goal range    - cont. Statin     8. Substance use   - + urine for amphetamines, pt denies use, admits to hx of using   \"speed\"  - educated on importance of substance use cessation      9. Tobacco use   - educate on importance of smoking cessation     10. Cough   - resolved     Cardiology to sign off     It " is my pleasure to participate in the care of Mr. Hyatt.  Please do not hesitate to contact me with questions or concerns.    Magi Santos  PGY 2 [employer] Cardiology

## 2019-03-26 VITALS
OXYGEN SATURATION: 97 % | RESPIRATION RATE: 18 BRPM | TEMPERATURE: 97.1 F | HEART RATE: 81 BPM | DIASTOLIC BLOOD PRESSURE: 87 MMHG | SYSTOLIC BLOOD PRESSURE: 139 MMHG | BODY MASS INDEX: 37.82 KG/M2 | WEIGHT: 249.56 LBS | HEIGHT: 68 IN

## 2019-03-26 LAB
BACTERIA BLD CULT: NORMAL
BACTERIA BLD CULT: NORMAL
GLUCOSE BLD-MCNC: 213 MG/DL (ref 65–99)
SIGNIFICANT IND 70042: NORMAL
SIGNIFICANT IND 70042: NORMAL
SITE SITE: NORMAL
SITE SITE: NORMAL
SOURCE SOURCE: NORMAL
SOURCE SOURCE: NORMAL

## 2019-03-26 PROCEDURE — 82962 GLUCOSE BLOOD TEST: CPT

## 2019-03-26 PROCEDURE — 99239 HOSP IP/OBS DSCHRG MGMT >30: CPT | Performed by: FAMILY MEDICINE

## 2019-03-26 PROCEDURE — 700102 HCHG RX REV CODE 250 W/ 637 OVERRIDE(OP): Performed by: STUDENT IN AN ORGANIZED HEALTH CARE EDUCATION/TRAINING PROGRAM

## 2019-03-26 PROCEDURE — 700102 HCHG RX REV CODE 250 W/ 637 OVERRIDE(OP): Performed by: INTERNAL MEDICINE

## 2019-03-26 PROCEDURE — A9270 NON-COVERED ITEM OR SERVICE: HCPCS | Performed by: STUDENT IN AN ORGANIZED HEALTH CARE EDUCATION/TRAINING PROGRAM

## 2019-03-26 PROCEDURE — A9270 NON-COVERED ITEM OR SERVICE: HCPCS | Performed by: INTERNAL MEDICINE

## 2019-03-26 RX ADMIN — ISOSORBIDE DINITRATE 20 MG: 10 TABLET ORAL at 06:13

## 2019-03-26 RX ADMIN — LOSARTAN POTASSIUM 50 MG: 50 TABLET ORAL at 06:14

## 2019-03-26 RX ADMIN — METFORMIN HYDROCHLORIDE 1000 MG: 500 TABLET, FILM COATED ORAL at 06:13

## 2019-03-26 RX ADMIN — CLOPIDOGREL BISULFATE 75 MG: 75 TABLET ORAL at 06:13

## 2019-03-26 RX ADMIN — HYDRALAZINE HYDROCHLORIDE 50 MG: 50 TABLET, FILM COATED ORAL at 06:13

## 2019-03-26 RX ADMIN — CARVEDILOL 25 MG: 25 TABLET, FILM COATED ORAL at 06:13

## 2019-03-26 RX ADMIN — SPIRONOLACTONE 25 MG: 25 TABLET ORAL at 06:13

## 2019-03-26 RX ADMIN — ASPIRIN 81 MG: 81 TABLET, COATED ORAL at 06:13

## 2019-03-26 RX ADMIN — FUROSEMIDE 40 MG: 40 TABLET ORAL at 06:14

## 2019-03-26 NOTE — DISCHARGE INSTRUCTIONS
Discharge Instructions    Discharged to home by car with self. Discharged via wheelchair, hospital escort: Yes.  Special equipment needed: Not Applicable    Be sure to schedule a follow-up appointment with your primary care doctor or any specialists as instructed.     Discharge Plan:   Smoking Cessation Offered: Patient Refused  Influenza Vaccine Indication: Not indicated: Previously immunized this influenza season and > 8 years of age    I understand that a diet low in cholesterol, fat, and sodium is recommended for good health. Unless I have been given specific instructions below for another diet, I accept this instruction as my diet prescription.   Other diet: Cardiac and Diabetic diet     Special Instructions: Diagnosis:  Acute Coronary Syndrome (ACS) is a diagnosis that encompasses cardiac-related chest pain and heart attack. ACS occurs when the blood flow to the heart muscle is severely reduced or cut off completely due to a slow process called atherosclerosis.  Atherosclerosis is a disease in which the coronary arteries become narrow from a buildup of fat, cholesterol, and other substances that combine to form plaque. If the plaque breaks, a blood clot will form and block the blood flow to the heart muscle. This lack of blood flow can cause damage or death to the heart muscle which is called a heart attack or Myocardial Infarction (MI). There are two different types of MIs:  ST Elevation Myocardial Infarction or STEMI (the most severe type of heart attack) and Non-ST Elevation Myocardial Infarction or NSTEMI.    Treatment Plan:  · Cardiac Diet  - Low fat, low salt, low cholesterol   · Cardiac Rehab  - Your doctor has ordered you a referral to [facility].  Call [phone] to schedule an appointment.  · Attend my follow-up appointment with my Cardiologist.  · Take my medications as prescribed by my doctor  · Exercise daily  · Quit Smoking and Lower my bad cholesterol and raise my good cholesterol and control my  diabetes.     Medications:  Certain medications are used to treat ACS.  Remember to always take medications as prescribed and never stop talking medications unless told by your doctor.    You have been prescribed the following medicatons:    Aspirin - Aspirin is used as a blood thinning medication and you will require this medication indefinitely.  Beta-Blocker - Beta-Blocker Coreg is used to lower blood pressure and heart rate, and/or helps your heart heal after a heart attack.  Statin - Statin Astrovastin is used to lower cholesterol.    · Is patient discharged on Warfarin / Coumadin?   No         Heart Failure  Heart failure means your heart has trouble pumping blood. This makes it hard for your body to work well. Heart failure is usually a long-term (chronic) condition. You must take good care of yourself and follow your doctor's treatment plan.  HOME CARE  · Take your heart medicine as told by your doctor.  ¨ Do not stop taking medicine unless your doctor tells you to.  ¨ Do not skip any dose of medicine.  ¨ Refill your medicines before they run out.  ¨ Take other medicines only as told by your doctor or pharmacist.  · Stay active if told by your doctor. The elderly and people with severe heart failure should talk with a doctor about physical activity.  · Eat heart-healthy foods. Choose foods that are without trans fat and are low in saturated fat, cholesterol, and salt (sodium). This includes fresh or frozen fruits and vegetables, fish, lean meats, fat-free or low-fat dairy foods, whole grains, and high-fiber foods. Lentils and dried peas and beans (legumes) are also good choices.  · Limit salt if told by your doctor.  · Cook in a healthy way. Roast, grill, broil, bake, poach, steam, or stir-manuel foods.  · Limit fluids as told by your doctor.  · Weigh yourself every morning. Do this after you pee (urinate) and before you eat breakfast. Write down your weight to give to your doctor.  · Take your blood pressure  and write it down if your doctor tells you to.  · Ask your doctor how to check your pulse. Check your pulse as told.  · Lose weight if told by your doctor.  · Stop smoking or chewing tobacco. Do not use gum or patches that help you quit without your doctor's approval.  · Schedule and go to doctor visits as told.  · Nonpregnant women should have no more than 1 drink a day. Men should have no more than 2 drinks a day. Talk to your doctor about drinking alcohol.  · Stop illegal drug use.  · Stay current with shots (immunizations).  · Manage your health conditions as told by your doctor.  · Learn to manage your stress.  · Rest when you are tired.  · If it is really hot outside:  ¨ Avoid intense activities.  ¨ Use air conditioning or fans, or get in a cooler place.  ¨ Avoid caffeine and alcohol.  ¨ Wear loose-fitting, lightweight, and light-colored clothing.  · If it is really cold outside:  ¨ Avoid intense activities.  ¨ Layer your clothing.  ¨ Wear mittens or gloves, a hat, and a scarf when going outside.  ¨ Avoid alcohol.  · Learn about heart failure and get support as needed.  · Get help to maintain or improve your quality of life and your ability to care for yourself as needed.  GET HELP IF:   · You gain weight quickly.  · You are more short of breath than usual.  · You cannot do your normal activities.  · You tire easily.  · You cough more than normal, especially with activity.  · You have any or more puffiness (swelling) in areas such as your hands, feet, ankles, or belly (abdomen).  · You cannot sleep because it is hard to breathe.  · You feel like your heart is beating fast (palpitations).  · You get dizzy or light-headed when you stand up.  GET HELP RIGHT AWAY IF:   · You have trouble breathing.  · There is a change in mental status, such as becoming less alert or not being able to focus.  · You have chest pain or discomfort.  · You faint.  MAKE SURE YOU:   · Understand these instructions.  · Will watch your  condition.  · Will get help right away if you are not doing well or get worse.  This information is not intended to replace advice given to you by your health care provider. Make sure you discuss any questions you have with your health care provider.  Document Released: 09/26/2009 Document Revised: 01/08/2016 Document Reviewed: 02/03/2014  [employer] Patient Education © 2017 [employer]    Depression / Suicide Risk    As you are discharged from this [facility] facility, it is important to learn how to keep safe from harming yourself.    Recognize the warning signs:  · Abrupt changes in personality, positive or negative- including increase in energy   · Giving away possessions  · Change in eating patterns- significant weight changes-  positive or negative  · Change in sleeping patterns- unable to sleep or sleeping all the time   · Unwillingness or inability to communicate  · Depression  · Unusual sadness, discouragement and loneliness  · Talk of wanting to die  · Neglect of personal appearance   · Rebelliousness- reckless behavior  · Withdrawal from people/activities they love  · Confusion- inability to concentrate     If you or a loved one observes any of these behaviors or has concerns about self-harm, here's what you can do:  · Talk about it- your feelings and reasons for harming yourself  · Remove any means that you might use to hurt yourself (examples: pills, rope, extension cords, firearm)  · Get professional help from the community (Mental Health, Substance Abuse, psychological counseling)  · Do not be alone:Call your Safe Contact- someone whom you trust who will be there for you.  · Call your local CRISIS HOTLINE 740-9296 or 753-972-8438  · Call your local Children's Mobile Crisis Response Team [place] (353) 300-7405 or www.[employer]  · Call the toll free National Suicide Prevention Hotlines   · National Suicide Prevention Lifeline 039-005-SZTL (6071)  · National MynewMD Line Network  800-SUICIDE (269-1332)

## 2019-03-26 NOTE — DISCHARGE SUMMARY
Discharge Summary    CHIEF COMPLAINT ON ADMISSION  Chief Complaint   Patient presents with   • Chest Pain     Interfacility transfer from [facility] for NSTEMI       Reason for Admission  EMS     Admission Date  3/20/2019    CODE STATUS  Prior    HPI & HOSPITAL COURSE  This is a 53 y.o. male with past medical history of tobacco abuse, methamphetamine abuse, hypertension, hyperlipidemia comes in with shortness of breath and chest pain.  Was found to have non-STEMI.  Echocardiogram showed ejection fraction 40% with pulmonary hypertension.  Coronary angiogram showed coronary disease with no intervention was done.  Cardiology recommended maximal medical management.  Clinically stable.  He is uninsured.   were consulted to help to provide 1 month supply medications.  Cardiology recommended aspirin, Plavix, statin, carvedilol, Aldactone, isosorbide and hydralazine for his medical management.  Transportation was provided as patient lives in [place] by the help of .  The day of discharge patient was immediately clinically stable.  He was chest pain-free.  He was ambulatory in the room down the vincent.  He was tolerating p.o. fairly.  He was cleared for discharge from medical standpoint.       Therefore, he is discharged in fair and stable condition to home with close outpatient follow-up.    The patient met 2-midnight criteria for an inpatient stay at the time of discharge.    Discharge Date  3/26/2019    FOLLOW UP ITEMS POST DISCHARGE  Follow-up with PCP in 1 week  Follow-up with cardiology as per recommendations    DISCHARGE DIAGNOSES  Principal Problem:    NSTEMI (non-ST elevated myocardial infarction) (HCC) POA: Yes  Active Problems:    Acute combined systolic and diastolic heart failure (HCC) POA: Yes    Bandemia POA: Yes    Cough POA: Yes    Amphetamine abuse (HCC) POA: Yes    Normocytic anemia POA: Yes    Class 3 severe obesity due to excess calories without serious comorbidity with body mass  index (BMI) of 40.0 to 44.9 in adult ([facility]) POA: Yes    Type 2 diabetes mellitus with hyperglycemia, without long-term current use of insulin ([facility]) POA: Yes    Dyslipidemia POA: Yes    Essential hypertension POA: Yes  Resolved Problems:    * No resolved hospital problems. *      FOLLOW UP  No future appointments.  [address]  [place] [address]  [phone]  Go on 3/28/2019  PLEASE WALK IN AT 9AM TO ESTABLISH FOLLOW UP WITH A PCP IF UNABLE TO FIND A DOCTOR IN [place]. THANK YOU      MEDICATIONS ON DISCHARGE     Medication List      START taking these medications      Instructions   aspirin 81 MG EC tablet   Take 1 Tab by mouth every day.  Dose:  81 mg     carvedilol 25 MG Tabs  Commonly known as:  COREG   Take 1 Tab by mouth 2 times a day, with meals.  Dose:  25 mg     clopidogrel 75 MG Tabs  Commonly known as:  PLAVIX   Take 1 Tab by mouth every day.  Dose:  75 mg     hydrALAZINE 50 MG Tabs  Commonly known as:  APRESOLINE   Take 1 Tab by mouth every 8 hours.  Dose:  50 mg     isosorbide dinitrate 20 MG Tabs  Commonly known as:  ISORDIL   Take 1 Tab by mouth every 8 hours.  Dose:  20 mg     spironolactone 25 MG Tabs  Commonly known as:  ALDACTONE   Take 1 Tab by mouth every day.  Dose:  25 mg        CHANGE how you take these medications      Instructions   atorvastatin 80 MG tablet  What changed:  · medication strength  · how much to take  · when to take this  Commonly known as:  LIPITOR   Take 1 Tab by mouth every evening.  Dose:  80 mg     furosemide 40 MG Tabs  What changed:  · medication strength  · how much to take  · when to take this  Commonly known as:  LASIX   Take 1 Tab by mouth every day.  Dose:  40 mg     losartan 100 MG Tabs  What changed:  · medication strength  · how much to take  Commonly known as:  COZAAR   Take 1 Tab by mouth every day.  Dose:  100 mg     metformin 1000 MG tablet  What changed:  medication strength  Commonly known as:  GLUCOPHAGE   Take 1  Tab by mouth 2 times a day, with meals.  Dose:  1000 mg            Allergies  No Known Allergies    DIET  No orders of the defined types were placed in this encounter.      ACTIVITY  As tolerated.  Weight bearing as tolerated    CONSULTATIONS  Cardiology    PROCEDURES  As above    LABORATORY  Lab Results   Component Value Date    SODIUM 137 03/24/2019    POTASSIUM 3.7 03/24/2019    CHLORIDE 102 03/24/2019    CO2 28 03/24/2019    GLUCOSE 324 (H) 03/24/2019    BUN 24 (H) 03/24/2019    CREATININE 1.32 03/24/2019        Lab Results   Component Value Date    WBC 9.5 03/24/2019    HEMOGLOBIN 12.9 (L) 03/24/2019    HEMATOCRIT 39.4 (L) 03/24/2019    PLATELETCT 250 03/24/2019        Total time of the discharge process exceeds 40 minutes.

## 2019-03-26 NOTE — PROGRESS NOTES
Hospital Medicine Daily Progress Note    Date of Service  3/25/2019    Chief Complaint  53 y.o. male admitted 3/20/2019 with chest pain    Hospital Course    Patient resented with chest pain to the emergency department on March 20, 2019.  Underlying history of type 2 diabetes mellitus, methamphetamine abuse, nicotine dependence, nonmedical compliance.  He was found to have   NSTEMI, cardiology consulted      Interval Problem Update  Patient seen and evaluated on rounds  Discussed with nursing staff on rounds  No shortness of breath  No chest pain  No cough today    Ongoing methamphetamine abuse prior to presentation  Ongoing nicotine dependence prior to presentation      Eager to discharge home    Continue afterload reduction     Cardiology following     Patient is uninsured, prescriptions provided to the  for arrangement of these medications.  In addition he will need arrangement of right back to [place],  to arrange these, anticipate discharge with this tomorrow.    Consultants/Specialty  Cardiology    Code Status  Full code    Disposition  Continue telemetry monitoring  Anticipate discharge home once acute issues have resolved    Review of Systems  Review of Systems   Constitutional: Negative for chills, diaphoresis, fever and malaise/fatigue.   HENT: Negative for hearing loss.    Eyes: Negative for blurred vision and double vision.   Respiratory: Positive for shortness of breath. Negative for cough, hemoptysis and sputum production.    Cardiovascular: Positive for leg swelling. Negative for chest pain, palpitations, orthopnea, claudication and PND.   Gastrointestinal: Negative for abdominal pain, blood in stool, constipation, diarrhea, heartburn, melena, nausea and vomiting.   Genitourinary: Positive for frequency (Lasix related). Negative for dysuria, flank pain, hematuria and urgency.   Musculoskeletal: Negative for back pain, falls, joint pain, myalgias and neck pain.   Skin:  Negative for itching and rash.   Neurological: Negative for dizziness, tingling, tremors, sensory change, speech change, focal weakness, seizures, loss of consciousness, weakness and headaches.   Psychiatric/Behavioral: Positive for substance abuse. Negative for depression and suicidal ideas.        Physical Exam  Temp:  [36.1 °C (97 °F)-37.3 °C (99.1 °F)] 37.1 °C (98.7 °F)  Pulse:  [81-89] 84  Resp:  [15-20] 15  BP: (128-146)/(74-91) 140/77  SpO2:  [91 %-96 %] 93 %    Physical Exam   Constitutional: He is oriented to person, place, and time. He appears well-developed and well-nourished. No distress.   Body mass index is 40.83 kg/m².   HENT:   Head: Normocephalic.   Mouth/Throat: Oropharynx is clear and moist. No oropharyngeal exudate.   Eyes: Pupils are equal, round, and reactive to light. Conjunctivae and EOM are normal. No scleral icterus.   Neck: Normal range of motion. No JVD present. No thyromegaly present.   Cardiovascular: Normal rate and regular rhythm.    Murmur heard.  Pulmonary/Chest: No stridor. No respiratory distress. He has no wheezes. He has rales.   Abdominal: Soft. Bowel sounds are normal. He exhibits no distension. There is no tenderness. There is no rebound and no guarding.   Musculoskeletal: He exhibits edema. He exhibits no tenderness or deformity.   Lymphadenopathy:     He has no cervical adenopathy.   Neurological: He is alert and oriented to person, place, and time. He has normal reflexes. No cranial nerve deficit.   Skin: Skin is warm and dry. He is not diaphoretic.   Psychiatric: He has a normal mood and affect. His behavior is normal. Judgment normal.       Fluids    Intake/Output Summary (Last 24 hours) at 03/25/19 1903  Last data filed at 03/25/19 1300   Gross per 24 hour   Intake              958 ml   Output             1725 ml   Net             -767 ml       Laboratory  Recent Labs      03/24/19   2345   WBC  9.5   RBC  4.42*   HEMOGLOBIN  12.9*   HEMATOCRIT  39.4*   MCV  89.1   MCH   29.2   MCHC  32.7*   RDW  45.7   PLATELETCT  250   MPV  10.0     Recent Labs      03/23/19   0041  03/24/19   2345   SODIUM  136  137   POTASSIUM  4.1  3.7   CHLORIDE  104  102   CO2  27  28   GLUCOSE  209*  324*   BUN  20  24*   CREATININE  1.28  1.32   CALCIUM  8.2*  8.4*                   Imaging  NM-LUNG VENT/PERF IMAGING   Final Result      Low probability for pulmonary embolus.      DX-CHEST-2 VIEWS   Final Result      Small right pleural effusion.      Mild cardiomegaly.         EC-ECHOCARDIOGRAM COMPLETE W/ CONT   Final Result      DX-CHEST-LIMITED (1 VIEW)   Final Result      Stable chest findings compared with 3/20.               INTERPRETING LOCATION: [facility] [place], [place], [facility], [address]      DX-CHEST-PORTABLE (1 VIEW)   Final Result         1.  Pulmonary edema and/or infiltrates.   2.  Cardiomegaly      OUTSIDE IMAGES-CT CHEST   Final Result      OUTSIDE IMAGES-DX CHEST   Final Result      CL-LEFT HEART CATHETERIZATION WITH POSSIBLE INTERVENTION    (Results Pending)        Assessment/Plan  * NSTEMI (non-ST elevated myocardial infarction) (HCC)- (present on admission)   Assessment & Plan    Coronary angiogram revealing coronary artery disease, no intervention done, medical management recommended    Echocardiogram with depressed EF of 40% and pulmonary hypertension    Aspirin 81, Plavix 75, high intensity statin therapy, carvedilol, ARB, Aldactone, Isordil, hydralazine     Acute combined systolic and diastolic heart failure (HCC)- (present on admission)   Assessment & Plan    Continue medical management     Cough- (present on admission)   Assessment & Plan    This is secondary to pulmonary edema, cardiogenic  No infectious concerns apparent  Stop antibiotics     Bandemia- (present on admission)   Assessment & Plan    Stress related, monitor     Essential hypertension- (present on admission)   Assessment & Plan    Continue afterload reduction     Dyslipidemia- (present on admission)   Assessment & Plan    Continue  atorvastatin     Type 2 diabetes mellitus with hyperglycemia, without long-term current use of insulin ([facility])- (present on admission)   Assessment & Plan    Hemoglobin A1c 7.8,  Start metformin  Sliding scale insulin No. 2     Class 3 severe obesity due to excess calories without serious comorbidity with body mass index (BMI) of 40.0 to 44.9 in adult ([facility])- (present on admission)   Assessment & Plan    Body mass index is 40.96 kg/m².     Normocytic anemia- (present on admission)   Assessment & Plan    Mild, no evidence of acute bleeding  Recommend further evaluation and management per PCP in the outpatient setting     Amphetamine abuse ([facility])- (present on admission)   Assessment & Plan    Discussed with patient on March 21, 2019  Discussed his positive urine toxicology  Patient in agreement, ongoing methamphetamine use prior to presentation  Reports that he smokes methamphetamine  Last used 2-3 days prior to presentation  Intermittently has persisted to use methamphetamines  Patient has been counseled and educated regarding cessation of drugs  Adverse effects of methamphetamines including cardiac discussed extensively with the patient  Benefits of cessation discussed with the patient          VTE prophylaxis: SC Heparin

## 2019-03-26 NOTE — DISCHARGE PLANNING
RN CM faxed approved services to [facility] for medications sent over yesterday.    CM called [facility] to verify pharmacy received fax.  [facility] pharmacy currently working on order.

## 2019-03-26 NOTE — CARE PLAN
Problem: Safety  Goal: Will remain free from falls  Outcome: PROGRESSING AS EXPECTED  Pt aware of risks for falls, refused alarm     Problem: Knowledge Deficit  Goal: Knowledge of disease process/condition, treatment plan, diagnostic tests, and medications will improve  Outcome: PROGRESSING AS EXPECTED  Able to express importance of continuing home medications

## 2019-03-26 NOTE — DISCHARGE PLANNING
RN CM called [facility] to book ticket.    Bus departing from [facility] at 1:30 pm to [place] arriving at 7:40pm.    Ticket $59.00 paid in full with approved services     Pt does not need a copy of the receipt to board bus but only needs to give  his name.  Pt is to go to Door D in baggage claim     RN CM updated bedside RN.    Bedside RN stated pt had already left.  RN CM called pt cell phone and left VM with instructions to board bus

## 2019-03-26 NOTE — PROGRESS NOTES
Pt discharged. 30 day supply of medications given. Bus pass [place] get to the airport given, map and instructions printed. Bus pass to get to [place] purchased and pt needs to state name at gate D to get access to bus, pt left prior to this RN relaying that information. [facility] [place] is leaving at message on pts cell phone.

## 2019-03-27 LAB
ALBUMIN SERPL-MCNC: 2.42 G/DL (ref 3.75–5.01)
ALPHA1 GLOB SERPL ELPH-MCNC: 0.4 G/DL (ref 0.19–0.46)
ALPHA2 GLOB SERPL ELPH-MCNC: 0.94 G/DL (ref 0.48–1.05)
B-GLOBULIN SERPL ELPH-MCNC: 0.92 G/DL (ref 0.48–1.1)
EER PROT ELECT SER Q1092: ABNORMAL
GAMMA GLOB SERPL ELPH-MCNC: 1.22 G/DL (ref 0.62–1.51)
INTERPRETATION SERPL IFE-IMP: ABNORMAL
PROT SERPL-MCNC: 5.9 G/DL (ref 6–8.3)

## 2021-06-01 DIAGNOSIS — J30.9 ALLERGIC RHINITIS, UNSPECIFIED SEASONALITY, UNSPECIFIED TRIGGER: Primary | ICD-10-CM

## 2021-06-01 RX ORDER — MONTELUKAST SODIUM 10 MG/1
10 TABLET ORAL NIGHTLY
Qty: 90 TABLET | Refills: 1 | Status: SHIPPED | OUTPATIENT
Start: 2021-06-01

## 2021-06-01 RX ORDER — MONTELUKAST SODIUM 10 MG/1
10 TABLET ORAL NIGHTLY
COMMUNITY
End: 2021-06-01 | Stop reason: SDUPTHER

## 2021-06-01 RX ORDER — PANTOPRAZOLE SODIUM 40 MG/1
40 TABLET, DELAYED RELEASE ORAL DAILY
COMMUNITY

## 2021-06-01 RX ORDER — DICLOFENAC SODIUM 10 MG/G
2 GEL TOPICAL 2 TIMES DAILY PRN
COMMUNITY

## 2022-04-11 ENCOUNTER — OFFICE VISIT (OUTPATIENT)
Dept: SLEEP MEDICINE | Facility: CLINIC | Age: 57
End: 2022-04-11
Attending: FAMILY MEDICINE
Payer: OTHER GOVERNMENT

## 2022-04-11 VITALS
DIASTOLIC BLOOD PRESSURE: 70 MMHG | HEART RATE: 70 BPM | BODY MASS INDEX: 33.9 KG/M2 | HEIGHT: 74 IN | OXYGEN SATURATION: 97 % | WEIGHT: 264.19 LBS | SYSTOLIC BLOOD PRESSURE: 114 MMHG

## 2022-04-11 DIAGNOSIS — G47.33 OSA ON CPAP: Primary | ICD-10-CM

## 2022-04-11 PROCEDURE — 99213 OFFICE O/P EST LOW 20 MIN: CPT | Mod: PBBFAC | Performed by: FAMILY MEDICINE

## 2022-04-11 PROCEDURE — 99212 OFFICE O/P EST SF 10 MIN: CPT | Mod: S$PBB,,, | Performed by: FAMILY MEDICINE

## 2022-04-11 PROCEDURE — 99212 PR OFFICE/OUTPT VISIT, EST, LEVL II, 10-19 MIN: ICD-10-PCS | Mod: S$PBB,,, | Performed by: FAMILY MEDICINE

## 2022-04-11 NOTE — PROCEDURES
Procedures  Patient presents to clinic for CPAP management.  Patient ESS is 11.  Patient feels he is not getting enough pressure, patient is snoring over machine.  Patient has a CPAP that is under recall and has been registered.  Patient has discussed with Dr. Burgos the recall and the issues arround the recall.  Patient is using CPAP for 8.8 hours and is 100% compliant.

## 2022-04-11 NOTE — PROGRESS NOTES
Subjective:       Patient ID: Mitch Dinh is a 57 y.o. male.    Chief Complaint: Sleep Apnea (CPAP management)    Patient follows up for CPAP management having no problems with his mask or CPAP other than occasionally he will wake himself up snoring over the machine.  His CPAP is over 4 years old and is due for a new CPAP.  Patient's compliance is 100% with an average AHI of 3.0 at a pressure of 7 cm H2O.  [place] score is 11 and patient is using and benefitting from CPAP.  Patient's initial AHI was 8.3 with a low O2 saturation of 80%.  I will write prescription for new CPAP and have patient follow-up 1 month after starting his new CPAP machine.    Review of Systems   Constitutional: Negative for fatigue.        Negative EDS   Respiratory: Negative for apnea.    Psychiatric/Behavioral: Negative for sleep disturbance.         Objective:      Physical Exam  Constitutional:       Appearance: He is obese.   Cardiovascular:      Rate and Rhythm: Normal rate and regular rhythm.      Heart sounds: No murmur heard.  Pulmonary:      Breath sounds: Normal breath sounds.   Skin:     General: Skin is warm and dry.   Neurological:      Mental Status: He is alert and oriented to person, place, and time.         Assessment:       Problem List Items Addressed This Visit        Other    GABRIELLA on CPAP - Primary          Plan:       1. New CPAP @ 7.0 cm H20  2. Caution while operating a motor vehicle  3. Weight loss management  4. Prescription for new supplies  5. Follow up sleep clinic in 1 month after starting new CPAP.

## 2023-07-13 ENCOUNTER — HOSPITAL ENCOUNTER (EMERGENCY)
Facility: HOSPITAL | Age: 58
Discharge: HOME | End: 2023-07-13
Payer: OTHER GOVERNMENT

## 2023-07-13 VITALS
HEIGHT: 74 IN | BODY MASS INDEX: 33.37 KG/M2 | HEART RATE: 95 BPM | WEIGHT: 260 LBS | OXYGEN SATURATION: 96 % | RESPIRATION RATE: 18 BRPM | SYSTOLIC BLOOD PRESSURE: 122 MMHG | DIASTOLIC BLOOD PRESSURE: 86 MMHG | TEMPERATURE: 99 F

## 2023-07-13 DIAGNOSIS — J01.00 ACUTE NON-RECURRENT MAXILLARY SINUSITIS: ICD-10-CM

## 2023-07-13 DIAGNOSIS — J01.10 ACUTE NON-RECURRENT FRONTAL SINUSITIS: Primary | ICD-10-CM

## 2023-07-13 DIAGNOSIS — R05.9 COUGH: ICD-10-CM

## 2023-07-13 LAB
ALBUMIN SERPL BCP-MCNC: 4 G/DL (ref 3.5–5)
ALBUMIN/GLOB SERPL: 0.9 {RATIO}
ALP SERPL-CCNC: 101 U/L (ref 45–115)
ALT SERPL W P-5'-P-CCNC: 57 U/L (ref 16–61)
ANION GAP SERPL CALCULATED.3IONS-SCNC: 12 MMOL/L (ref 7–16)
AST SERPL W P-5'-P-CCNC: 41 U/L (ref 15–37)
BASOPHILS # BLD AUTO: 0.06 K/UL (ref 0–0.2)
BASOPHILS NFR BLD AUTO: 0.7 % (ref 0–1)
BILIRUB SERPL-MCNC: 0.3 MG/DL (ref ?–1.2)
BUN SERPL-MCNC: 8 MG/DL (ref 7–18)
BUN/CREAT SERPL: 11 (ref 6–20)
CALCIUM SERPL-MCNC: 9.3 MG/DL (ref 8.5–10.1)
CHLORIDE SERPL-SCNC: 100 MMOL/L (ref 98–107)
CO2 SERPL-SCNC: 28 MMOL/L (ref 21–32)
CREAT SERPL-MCNC: 0.72 MG/DL (ref 0.7–1.3)
DIFFERENTIAL METHOD BLD: ABNORMAL
EGFR (NO RACE VARIABLE) (RUSH/TITUS): 106 ML/MIN/1.73M2
EOSINOPHIL # BLD AUTO: 0.57 K/UL (ref 0–0.5)
EOSINOPHIL NFR BLD AUTO: 6.9 % (ref 1–4)
ERYTHROCYTE [DISTWIDTH] IN BLOOD BY AUTOMATED COUNT: 13.7 % (ref 11.5–14.5)
GLOBULIN SER-MCNC: 4.3 G/DL (ref 2–4)
GLUCOSE SERPL-MCNC: 140 MG/DL (ref 74–106)
HCT VFR BLD AUTO: 43.2 % (ref 40–54)
HGB BLD-MCNC: 14.4 G/DL (ref 13.5–18)
LYMPHOCYTES # BLD AUTO: 1.5 K/UL (ref 1–4.8)
LYMPHOCYTES NFR BLD AUTO: 18.1 % (ref 27–41)
MCH RBC QN AUTO: 30.3 PG (ref 27–31)
MCHC RBC AUTO-ENTMCNC: 33.3 G/DL (ref 32–36)
MCV RBC AUTO: 90.8 FL (ref 80–96)
MONOCYTES # BLD AUTO: 0.5 K/UL (ref 0–0.8)
MONOCYTES NFR BLD AUTO: 6 % (ref 2–6)
MPC BLD CALC-MCNC: 9.3 FL (ref 9.4–12.4)
NEUTROPHILS # BLD AUTO: 5.65 K/UL (ref 1.8–7.7)
NEUTROPHILS NFR BLD AUTO: 68.3 % (ref 53–65)
PLATELET # BLD AUTO: 214 K/UL (ref 150–400)
POTASSIUM SERPL-SCNC: 3.9 MMOL/L (ref 3.5–5.1)
PROT SERPL-MCNC: 8.3 G/DL (ref 6.4–8.2)
RAPID GROUP A STREP: NEGATIVE
RBC # BLD AUTO: 4.76 M/UL (ref 4.6–6.2)
SODIUM SERPL-SCNC: 136 MMOL/L (ref 136–145)
WBC # BLD AUTO: 8.28 K/UL (ref 4.5–11)

## 2023-07-13 PROCEDURE — 87880 STREP A ASSAY W/OPTIC: CPT | Performed by: REGISTERED NURSE

## 2023-07-13 PROCEDURE — 96365 THER/PROPH/DIAG IV INF INIT: CPT

## 2023-07-13 PROCEDURE — 96372 THER/PROPH/DIAG INJ SC/IM: CPT | Mod: 59 | Performed by: REGISTERED NURSE

## 2023-07-13 PROCEDURE — 99284 EMERGENCY DEPT VISIT MOD MDM: CPT | Mod: 25

## 2023-07-13 PROCEDURE — 80053 COMPREHEN METABOLIC PANEL: CPT | Performed by: REGISTERED NURSE

## 2023-07-13 PROCEDURE — 99284 PR EMERGENCY DEPT VISIT,LEVEL IV: ICD-10-PCS | Mod: ,,, | Performed by: REGISTERED NURSE

## 2023-07-13 PROCEDURE — 63600175 PHARM REV CODE 636 W HCPCS: Performed by: REGISTERED NURSE

## 2023-07-13 PROCEDURE — 85025 COMPLETE CBC W/AUTO DIFF WBC: CPT | Performed by: REGISTERED NURSE

## 2023-07-13 PROCEDURE — 25000003 PHARM REV CODE 250: Performed by: REGISTERED NURSE

## 2023-07-13 PROCEDURE — 99284 EMERGENCY DEPT VISIT MOD MDM: CPT | Mod: ,,, | Performed by: REGISTERED NURSE

## 2023-07-13 RX ORDER — METHYLPREDNISOLONE ACETATE 40 MG/ML
40 INJECTION, SUSPENSION INTRA-ARTICULAR; INTRALESIONAL; INTRAMUSCULAR; SOFT TISSUE
Status: COMPLETED | OUTPATIENT
Start: 2023-07-13 | End: 2023-07-13

## 2023-07-13 RX ORDER — CHLOPHEDIANOL HCL AND PYRILAMINE MALEATE 12.5; 12.5 MG/5ML; MG/5ML
10 SOLUTION ORAL EVERY 8 HOURS PRN
Qty: 473 ML | Refills: 0 | Status: SHIPPED | OUTPATIENT
Start: 2023-07-13

## 2023-07-13 RX ORDER — DEXAMETHASONE SODIUM PHOSPHATE 4 MG/ML
4 INJECTION, SOLUTION INTRA-ARTICULAR; INTRALESIONAL; INTRAMUSCULAR; INTRAVENOUS; SOFT TISSUE
Status: COMPLETED | OUTPATIENT
Start: 2023-07-13 | End: 2023-07-13

## 2023-07-13 RX ORDER — CHLOPHEDIANOL HYDROCHLORIDE, PYRILAMINE MALEATE, PSEUDOEPHEDRINE HYDROCHLORIDE 12.5; 12.5; 3 MG/5ML; MG/5ML; MG/5ML
10 LIQUID ORAL EVERY 6 HOURS PRN
Qty: 473 ML | Refills: 0 | Status: SHIPPED | OUTPATIENT
Start: 2023-07-13 | End: 2023-07-13 | Stop reason: RX

## 2023-07-13 RX ORDER — AZITHROMYCIN 250 MG/1
TABLET, FILM COATED ORAL
Qty: 6 TABLET | Refills: 0 | Status: SHIPPED | OUTPATIENT
Start: 2023-07-13 | End: 2023-07-18

## 2023-07-13 RX ORDER — CHOLECALCIFEROL (VITAMIN D3) 25 MCG
1000 TABLET ORAL DAILY
COMMUNITY

## 2023-07-13 RX ADMIN — DEXAMETHASONE SODIUM PHOSPHATE 4 MG: 4 INJECTION, SOLUTION INTRAMUSCULAR; INTRAVENOUS at 07:07

## 2023-07-13 RX ADMIN — CEFTRIAXONE 1 G: 1 INJECTION, POWDER, FOR SOLUTION INTRAMUSCULAR; INTRAVENOUS at 07:07

## 2023-07-13 RX ADMIN — METHYLPREDNISOLONE ACETATE 40 MG: 40 INJECTION, SUSPENSION INTRA-ARTICULAR; INTRALESIONAL; INTRAMUSCULAR; INTRASYNOVIAL; SOFT TISSUE at 07:07

## 2023-07-13 NOTE — ED PROVIDER NOTES
"Encounter Date: 7/13/2023       History     Chief Complaint   Patient presents with    Shortness of Breath    Cough    Nasal Congestion     Nasal congestion and cough x 3 days unresponsive to dayquil/nyquil.  Coughing up greenish brown secretions     Mitch Dinh (Ronny) is a 59 yo WM that presents with c/o nasal congestion and productive cough for 3 days.  States he has tried OTC sinus medication without relief.  Denies pain except when he coughs.  States sputum is greenish brown in color.  Denies fever.    The history is provided by the patient.   Review of patient's allergies indicates:  No Known Allergies  Past Medical History:   Diagnosis Date    Allergic rhinitis     Carpal tunnel syndrome     Epidemic vertigo     GERD (gastroesophageal reflux disease)     Hyperlipidemia     Internal hemorrhoids     Long term current use of therapeutic drug     Peptic ulcer     Plantar fascial fibromatosis     Sleep apnea      Past Surgical History:   Procedure Laterality Date    COLONOSCOPY  03/19/2013    FOOT SURGERY Right     KNEE SURGERY Left     SHOULDER SURGERY Right      History reviewed. No pertinent family history.  Social History     Tobacco Use    Smoking status: Former    Smokeless tobacco: Never   Substance Use Topics    Alcohol use: Yes     Comment: social    Drug use: Never     Review of Systems   Constitutional:  Negative for chills and fever.   HENT:  Positive for congestion, postnasal drip, rhinorrhea and sore throat. Negative for ear pain and trouble swallowing.    Respiratory:  Positive for cough. Negative for shortness of breath.    Cardiovascular:  Negative for chest pain.   Gastrointestinal:  Negative for nausea and vomiting.   Neurological:  Positive for headaches. Negative for weakness.   All other systems reviewed and are negative.    Physical Exam     Initial Vitals   BP Pulse Resp Temp SpO2   07/13/23 1833 07/13/23 1846 07/13/23 1833 07/13/23 1833 07/13/23 1846   122/86 95 18 98.8 °F (37.1 °C) 96 " %      MAP       --                Physical Exam    Constitutional: He appears well-developed and well-nourished. He is not diaphoretic. No distress.   HENT:   Head: Normocephalic and atraumatic.   Right Ear: External ear normal.   Left Ear: External ear normal.   Nose: Nose normal.   Mouth/Throat: Oropharynx is clear and moist. No oropharyngeal exudate.   Eyes: EOM are normal. Pupils are equal, round, and reactive to light.   Neck: Neck supple.   Normal range of motion.  Cardiovascular:  Normal rate, regular rhythm, normal heart sounds and intact distal pulses.           Pulmonary/Chest: No respiratory distress. He has wheezes in the right upper field.           Abdominal: Abdomen is soft. Bowel sounds are normal.   Musculoskeletal:         General: Normal range of motion.      Cervical back: Normal range of motion and neck supple.     Neurological: He is alert and oriented to person, place, and time. He has normal strength. GCS score is 15. GCS eye subscore is 4. GCS verbal subscore is 5. GCS motor subscore is 6.   Skin: Skin is warm and dry. Capillary refill takes less than 2 seconds.   Psychiatric: He has a normal mood and affect. His behavior is normal. Judgment and thought content normal.       Medical Screening Exam   See Full Note    ED Course   Procedures  Labs Reviewed   COMPREHENSIVE METABOLIC PANEL - Abnormal; Notable for the following components:       Result Value    Glucose 140 (*)     Total Protein 8.3 (*)     Globulin 4.3 (*)     AST 41 (*)     All other components within normal limits   CBC WITH DIFFERENTIAL - Abnormal; Notable for the following components:    MPV 9.3 (*)     Neutrophils % 68.3 (*)     Lymphocytes % 18.1 (*)     Eosinophils % 6.9 (*)     Eosinophils, Absolute 0.57 (*)     All other components within normal limits   THROAT SCREEN, RAPID STREP - Normal   CBC W/ AUTO DIFFERENTIAL    Narrative:     The following orders were created for panel order CBC auto differential.  Procedure                                Abnormality         Status                     ---------                               -----------         ------                     CBC with Differential[[id number]]        Abnormal            Final result                 Please view results for these tests on the individual orders.          Imaging Results              X-Ray Chest PA And Lateral (Final result)  Result time 07/13/23 19:10:44      Final result by Christian Ellis II, MD (07/13/23 19:10:44)                   Impression:      No evidence of cardiopulmonary disease.      Electronically signed by: Christian Ellis  Date:    07/13/2023  Time:    19:10               Narrative:    EXAMINATION:  XR CHEST PA AND LATERAL    CLINICAL HISTORY:  Cough, unspecified    COMPARISON:  None available    TECHNIQUE:  XR CHEST PA AND LATERAL    FINDINGS:  The heart and mediastinum are normal in size and configuration.  The pulmonary vascularity is normal in caliber.  No lung infiltrates, effusions, pneumothorax or other abnormality is demonstrated.                                       Medications   cefTRIAXone (ROCEPHIN) 1 g in dextrose 5 % in water (D5W) 5 % 100 mL IVPB (MB+) (0 g Intravenous Stopped 7/13/23 2020)   methylPREDNISolone acetate injection 40 mg (40 mg Intramuscular Given 7/13/23 1940)   dexAMETHasone injection 4 mg (4 mg Intramuscular Given 7/13/23 1940)     Medical Decision Making:   Initial Assessment:   59 yo WM presents with 3 day history of nasal congestion and productive cough with greenish brown color.  Has tried OTC medications without relief.  Differential Diagnosis:   -Pneumonia  -Bronchitis  -URI  Clinical Tests:   Lab Tests: Ordered and Reviewed  The following lab test(s) were unremarkable: CBC and CMP       <> Summary of Lab: 07/13/23 1925  Rapid strep screen   Collected: 07/13/23 1900  Final result  Specimen: Throat      Rapid Group A Strep Negative       07/13/23 1919  Comprehensive Metabolic Panel   Collected:  07/13/23 1900  Final result  Specimen: Blood      Sodium 136 mmol/L  Potassium 3.9 mmol/L  Chloride 100 mmol/L  CO2 28 mmol/L  Anion Gap 12 mmol/L  Glucose 140 High  mg/dL  BUN 8 mg/dL  Creatinine 0.72 mg/dL  BUN/Creatinine Ratio 11  Calcium 9.3 mg/dL  Total Protein 8.3 High  g/dL  Albumin 4.0 g/dL  Globulin 4.3 High  g/dL  A/G Ratio 0.9  Bilirubin, Total 0.3 mg/dL  Alk Phos 101 U/L  ALT 57 U/L  AST 41 High  U/L  eGFR 106 mL/min/1.73m2       07/13/23 1903  CBC auto differential   Collected: 07/13/23 1900  Final result  Specimen: Blood         07/13/23 1903  CBC with Differential   Collected: 07/13/23 1900  Final result  Specimen: Blood      WBC 8.28 K/uL  RBC 4.76 M/uL  Hemoglobin 14.4 g/dL  Hematocrit 43.2 %  MCV 90.8 fL  MCH 30.3 pg  MCHC 33.3 g/dL  RDW 13.7 %  Platelet Count 214 K/uL  MPV 9.3 Low  fL  Neutrophils % 68.3 High  %  Lymphocytes % 18.1 Low  %  Neutrophils, Abs 5.65 K/uL  Lymphocytes, Absolute 1.50 K/uL  Diff Type Auto  Monocytes % 6.0 %  Eosinophils % 6.9 High  %  Basophils % 0.7 %  Monocytes, Absolute 0.50 K/uL  Eosinophils, Absolute 0.57 High  K/uL  Basophils, Absolute 0.06 K/uL        Radiological Study: Ordered and Reviewed  ED Management:  X-Ray Chest PA And Lateral  Order: [id number]  Status: Final result     Visible to patient: No (inaccessible in Patient Portal)     Next appt: None     Dx: Cough     0 Result Notes  Details      Reading Physician Reading Date Result Priority  Christian Ellis II, MD  [phone] 7/13/2023 STAT    Narrative & Impression  EXAMINATION:  XR CHEST PA AND LATERAL     CLINICAL HISTORY:  Cough, unspecified     COMPARISON:  None available     TECHNIQUE:  XR CHEST PA AND LATERAL     FINDINGS:  The heart and mediastinum are normal in size and configuration.  The pulmonary vascularity is normal in caliber.  No lung infiltrates, effusions, pneumothorax or other abnormality is demonstrated.     Impression:     No evidence of cardiopulmonary disease.         Electronically signed by: Christian Ellis  Date:                                            07/13/2023  Time:                                           19:10        Exam Ended: 07/13/23 19:09 Last Resulted: 07/13/23 19:10    -Pt very tender in maxillary and frontal sinus areas  -Pt had INT placed with lab draw so will give IV Rocephin  -Will give depomedrol 40 mg and decadron 4 mg IM  -Will prescribe Zpack and Ninjacof.  Instructed to follow up with regular provider if symptoms fail to improve.  Pt verbalized understanding and is in agreement with the plan of care.                       Clinical Impression:   Final diagnoses:  [R05.9] Cough  [J01.10] Acute non-recurrent frontal sinusitis (Primary)  [J01.00] Acute non-recurrent maxillary sinusitis        ED Disposition Condition    Discharge Stable          ED Prescriptions       Medication Sig Dispense Start Date End Date Auth. Provider    azithromycin (Z-KETAN) 250 MG tablet Take 2 tablets by mouth on day 1; Take 1 tablet by mouth on days 2-5 6 tablet 7/13/2023 7/18/2023 CESIA Merino    pyrilamine-pseudoeph-chlophed (NINJACOF-D) 12.5-30-12.5 mg/5 mL Liqd  (Status: Discontinued) Take 10 mLs by mouth every 6 (six) hours as needed (cough and nasal congestion/drainage). Do not take more than 4 doses in 24 hours 473 mL 7/13/2023 7/13/2023 CESIA Merino    pyrilamine-chlophedianoL (NINJACOF) 12.5-12.5 mg/5 mL Liqd Take 10 mLs by mouth every 8 (eight) hours as needed (cough). 473 mL 7/13/2023 -- CESIA Merino          Follow-up Information       Follow up With Specialties Details Why Contact Info    Yoselin Lazar MD Family Medicine On 7/17/2023 If symptoms worsen or are not improving [address] [place] [address] [place] [id number] [phone]               CESIA Merino  07/13/23 2042

## 2023-07-14 NOTE — DISCHARGE INSTRUCTIONS
-Increase water intake  -Medication as prescribed  -Follow up with your regular provider if symptoms fail to improve

## 2023-10-03 NOTE — DISCHARGE PLANNING
Care Transition Team Assessment    Information Source  Orientation : Oriented x 4  Information Given By: Patient  Who is responsible for making decisions for patient? : Patient    Readmission Evaluation  Is this a readmission?: No    Elopement Risk  Legal Hold: No  Ambulatory or Self Mobile in Wheelchair: No-Not an Elopement Risk  Disoriented: No  Psychiatric Symptoms: None  History of Wandering: No  Elopement this Admit: No  Vocalizing Wanting to Leave: No  Displays Behaviors, Body Language Wanting to Leave: No-Not at Risk for Elopement  Elopement Risk: Not at Risk for Elopement    Interdisciplinary Discharge Planning  Patient or legal guardian wants to designate a caregiver (see row info): No  Prior Services: None    Discharge Preparedness  What is your plan after discharge?: Uncertain - pending medical team collaboration, Home with help  What are your discharge supports?: Other (comment)  Prior Functional Level: Ambulatory, Drives Self, Independent with Activities of Daily Living, Independent with Medication Management  Difficulity with ADLs: None  Difficulity with IADLs: Laundry, Shopping    Functional Assesment  Prior Functional Level: Ambulatory, Drives Self, Independent with Activities of Daily Living, Independent with Medication Management    Finances  Financial Barriers to Discharge: Yes  Average Monthly Income:  (Pt is unemployed)  Source of Income: None  Prescription Coverage: No  Prescription Coverage Comments:  (Pending [facility])              Advance Directive  Advance Directive?: None    Domestic Abuse  Have you ever been the victim of abuse or violence?: No    Psychological Assessment  History of Substance Abuse: Amphetamines, Prescription opioids  History of Psychiatric Problems: No  Non-compliant with Treatment: Yes (Pt is threatening to leave AMA)  Newly Diagnosed Illness: Yes    Discharge Risks or Barriers  Discharge risks or barriers?: No PCP, Uninsured / underinsured, Transportation, Substance  abuse  Patient risk factors: Noncompliance, No PCP, Substance abuse, Uninsured or underinsured    Anticipated Discharge Information  Anticipated discharge disposition: Discharge needs currently unknown, Home         0 = understands/communicates without difficulty

## 2023-10-16 PROBLEM — J01.10 ACUTE NON-RECURRENT FRONTAL SINUSITIS: Status: RESOLVED | Noted: 2023-07-13 | Resolved: 2023-10-16

## 2023-10-16 PROBLEM — J01.00 ACUTE NON-RECURRENT MAXILLARY SINUSITIS: Status: RESOLVED | Noted: 2023-07-13 | Resolved: 2023-10-16

## 2023-11-20 ENCOUNTER — OFFICE VISIT (OUTPATIENT)
Dept: SLEEP MEDICINE | Facility: CLINIC | Age: 58
End: 2023-11-20
Attending: FAMILY MEDICINE
Payer: OTHER GOVERNMENT

## 2023-11-20 VITALS
BODY MASS INDEX: 34.63 KG/M2 | WEIGHT: 269.81 LBS | HEIGHT: 74 IN | HEART RATE: 85 BPM | DIASTOLIC BLOOD PRESSURE: 82 MMHG | SYSTOLIC BLOOD PRESSURE: 121 MMHG | OXYGEN SATURATION: 95 %

## 2023-11-20 DIAGNOSIS — G47.33 OSA ON CPAP: Primary | ICD-10-CM

## 2023-11-20 PROCEDURE — 99212 OFFICE O/P EST SF 10 MIN: CPT | Mod: S$PBB,,, | Performed by: FAMILY MEDICINE

## 2023-11-20 PROCEDURE — 99214 OFFICE O/P EST MOD 30 MIN: CPT | Mod: PBBFAC | Performed by: FAMILY MEDICINE

## 2023-11-20 PROCEDURE — 99212 PR OFFICE/OUTPT VISIT, EST, LEVL II, 10-19 MIN: ICD-10-PCS | Mod: S$PBB,,, | Performed by: FAMILY MEDICINE

## 2023-11-20 RX ORDER — TESTOSTERONE CYPIONATE 200 MG/ML
1 INJECTION, SOLUTION INTRAMUSCULAR
COMMUNITY

## 2023-11-20 NOTE — PROGRESS NOTES
Patient presents to clinic for CPAP F/U. ESS is 12. Patient gets supplies from [employer], but is thinking about changing. Patient wears a nasal mask with nonheated tubing. Patient has 3 different machines.

## 2023-11-20 NOTE — PROGRESS NOTES
Subjective     Patient ID: Mitch Dinh is a 58 y.o. male.    Chief Complaint: Sleep Apnea (CPAP management)    Patient follows up in sleep clinic for CPAP management having no problems with his machine or mask.  Compliance is 73.3% with an average AHI of 1.8 at a pressure of 9 cm H2O.  The patient is using and benefitting from CPAP.  Patient uses a nasal mask with non heated tubing.  Patient's initial AHI was 8.3 with a low O2 saturation of 80%.      Review of Systems   Constitutional:  Negative for fatigue.        Negative EDS   Respiratory:  Negative for apnea.    Psychiatric/Behavioral:  Negative for sleep disturbance.           Objective     Physical Exam  Constitutional:       Appearance: He is obese.   Cardiovascular:      Rate and Rhythm: Normal rate and regular rhythm.      Heart sounds: No murmur heard.  Pulmonary:      Breath sounds: Normal breath sounds.   Skin:     General: Skin is warm and dry.   Neurological:      Mental Status: He is alert and oriented to person, place, and time.            Assessment and Plan     1. GABRIELLA on CPAP        Continue CPAP @ 9 cm H20  Caution while operating a motor vehicle  Weight loss management  Prescription for new supplies  Follow up sleep clinic in 1 year.           Follow up in about 1 year (around 11/20/2024).

## 2024-01-17 ENCOUNTER — TELEPHONE (OUTPATIENT)
Dept: SLEEP MEDICINE | Facility: CLINIC | Age: 59
End: 2024-01-17
Payer: OTHER GOVERNMENT

## 2024-06-10 ENCOUNTER — HOSPITAL ENCOUNTER (EMERGENCY)
Facility: HOSPITAL | Age: 59
Discharge: HOME | End: 2024-06-10
Payer: OTHER GOVERNMENT

## 2024-06-10 VITALS
RESPIRATION RATE: 18 BRPM | SYSTOLIC BLOOD PRESSURE: 112 MMHG | DIASTOLIC BLOOD PRESSURE: 80 MMHG | WEIGHT: 269.81 LBS | BODY MASS INDEX: 34.63 KG/M2 | TEMPERATURE: 99 F | HEART RATE: 100 BPM | OXYGEN SATURATION: 92 % | HEIGHT: 74 IN

## 2024-06-10 DIAGNOSIS — R11.2 NAUSEA AND VOMITING, UNSPECIFIED VOMITING TYPE: Primary | ICD-10-CM

## 2024-06-10 DIAGNOSIS — N20.0 KIDNEY STONE: ICD-10-CM

## 2024-06-10 LAB
ALBUMIN SERPL BCP-MCNC: 4.5 G/DL (ref 3.5–5)
ALBUMIN/GLOB SERPL: 1 {RATIO}
ALP SERPL-CCNC: 105 U/L (ref 45–115)
ALT SERPL W P-5'-P-CCNC: 56 U/L (ref 16–61)
AMORPH PHOS CRY #/AREA URNS LPF: ABNORMAL /LPF
ANION GAP SERPL CALCULATED.3IONS-SCNC: 16 MMOL/L (ref 7–16)
AST SERPL W P-5'-P-CCNC: 48 U/L (ref 15–37)
BACTERIA #/AREA URNS HPF: ABNORMAL /HPF
BASOPHILS # BLD AUTO: 0.01 K/UL (ref 0–0.2)
BASOPHILS NFR BLD AUTO: 0.1 % (ref 0–1)
BILIRUB SERPL-MCNC: 1 MG/DL (ref ?–1.2)
BILIRUB UR QL STRIP: ABNORMAL
BUN SERPL-MCNC: 16 MG/DL (ref 7–18)
BUN/CREAT SERPL: 13 (ref 6–20)
CALCIUM SERPL-MCNC: 9.5 MG/DL (ref 8.5–10.1)
CHLORIDE SERPL-SCNC: 102 MMOL/L (ref 98–107)
CLARITY UR: ABNORMAL
CO2 SERPL-SCNC: 28 MMOL/L (ref 21–32)
COLOR UR: YELLOW
CREAT SERPL-MCNC: 1.2 MG/DL (ref 0.7–1.3)
DIFFERENTIAL METHOD BLD: ABNORMAL
EGFR (NO RACE VARIABLE) (RUSH/TITUS): 70 ML/MIN/1.73M2
EOSINOPHIL # BLD AUTO: 0.04 K/UL (ref 0–0.5)
EOSINOPHIL NFR BLD AUTO: 0.5 % (ref 1–4)
ERYTHROCYTE [DISTWIDTH] IN BLOOD BY AUTOMATED COUNT: 14.8 % (ref 11.5–14.5)
GLOBULIN SER-MCNC: 4.5 G/DL (ref 2–4)
GLUCOSE SERPL-MCNC: 147 MG/DL (ref 74–106)
GLUCOSE UR STRIP-MCNC: NEGATIVE MG/DL
HCT VFR BLD AUTO: 51.1 % (ref 40–54)
HGB BLD-MCNC: 16.3 G/DL (ref 13.5–18)
INFLUENZA A MOLECULAR (OHS): NEGATIVE
INFLUENZA B MOLECULAR (OHS): NEGATIVE
KETONES UR STRIP-SCNC: NEGATIVE MG/DL
LEUKOCYTE ESTERASE UR QL STRIP: NEGATIVE
LIPASE SERPL-CCNC: 38 U/L (ref 16–77)
LYMPHOCYTES # BLD AUTO: 0.12 K/UL (ref 1–4.8)
LYMPHOCYTES NFR BLD AUTO: 1.6 % (ref 27–41)
LYMPHOCYTES NFR BLD MANUAL: 3 % (ref 27–41)
MCH RBC QN AUTO: 28.9 PG (ref 27–31)
MCHC RBC AUTO-ENTMCNC: 31.9 G/DL (ref 32–36)
MCV RBC AUTO: 90.6 FL (ref 80–96)
MONOCYTES # BLD AUTO: 0.24 K/UL (ref 0–0.8)
MONOCYTES NFR BLD AUTO: 3.3 % (ref 2–6)
MONOCYTES NFR BLD MANUAL: 3 % (ref 2–6)
MPC BLD CALC-MCNC: 9.9 FL (ref 9.4–12.4)
MUCOUS THREADS #/AREA URNS HPF: ABNORMAL /HPF
NEUTROPHILS # BLD AUTO: 6.94 K/UL (ref 1.8–7.7)
NEUTROPHILS NFR BLD AUTO: 94.5 % (ref 53–65)
NEUTS BAND NFR BLD MANUAL: 10 % (ref 1–5)
NEUTS SEG NFR BLD MANUAL: 84 % (ref 50–62)
NITRITE UR QL STRIP: NEGATIVE
NRBC BLD MANUAL-RTO: ABNORMAL %
PH UR STRIP: 5.5 PH UNITS
PLATELET # BLD AUTO: 222 K/UL (ref 150–400)
POTASSIUM SERPL-SCNC: 4.1 MMOL/L (ref 3.5–5.1)
PROT SERPL-MCNC: 9 G/DL (ref 6.4–8.2)
PROT UR QL STRIP: 100
RBC # BLD AUTO: 5.64 M/UL (ref 4.6–6.2)
RBC # UR STRIP: NEGATIVE /UL
RBC #/AREA URNS HPF: ABNORMAL /HPF
SARS-COV-2 RDRP RESP QL NAA+PROBE: NEGATIVE
SODIUM SERPL-SCNC: 142 MMOL/L (ref 136–145)
SP GR UR STRIP: >=1.03
SQUAMOUS #/AREA URNS LPF: ABNORMAL /LPF
UROBILINOGEN UR STRIP-ACNC: 0.2 MG/DL
WBC # BLD AUTO: 7.35 K/UL (ref 4.5–11)
WBC #/AREA URNS HPF: ABNORMAL /HPF

## 2024-06-10 PROCEDURE — 99285 EMERGENCY DEPT VISIT HI MDM: CPT | Mod: 25

## 2024-06-10 PROCEDURE — 99284 EMERGENCY DEPT VISIT MOD MDM: CPT | Mod: ,,,

## 2024-06-10 PROCEDURE — 87502 INFLUENZA DNA AMP PROBE: CPT

## 2024-06-10 PROCEDURE — 81001 URINALYSIS AUTO W/SCOPE: CPT

## 2024-06-10 PROCEDURE — 36592 COLLECT BLOOD FROM PICC: CPT

## 2024-06-10 PROCEDURE — 25000003 PHARM REV CODE 250

## 2024-06-10 PROCEDURE — 96374 THER/PROPH/DIAG INJ IV PUSH: CPT

## 2024-06-10 PROCEDURE — 96375 TX/PRO/DX INJ NEW DRUG ADDON: CPT

## 2024-06-10 PROCEDURE — 96361 HYDRATE IV INFUSION ADD-ON: CPT

## 2024-06-10 PROCEDURE — 85025 COMPLETE CBC W/AUTO DIFF WBC: CPT

## 2024-06-10 PROCEDURE — 83690 ASSAY OF LIPASE: CPT

## 2024-06-10 PROCEDURE — 80053 COMPREHEN METABOLIC PANEL: CPT

## 2024-06-10 PROCEDURE — 87635 SARS-COV-2 COVID-19 AMP PRB: CPT

## 2024-06-10 PROCEDURE — 63600175 PHARM REV CODE 636 W HCPCS

## 2024-06-10 PROCEDURE — 96372 THER/PROPH/DIAG INJ SC/IM: CPT

## 2024-06-10 RX ORDER — ORPHENADRINE CITRATE 30 MG/ML
60 INJECTION INTRAMUSCULAR; INTRAVENOUS
Status: COMPLETED | OUTPATIENT
Start: 2024-06-10 | End: 2024-06-10

## 2024-06-10 RX ORDER — HYDROCODONE BITARTRATE AND ACETAMINOPHEN 7.5; 325 MG/1; MG/1
1 TABLET ORAL EVERY 6 HOURS PRN
Qty: 12 TABLET | Refills: 0 | Status: SHIPPED | OUTPATIENT
Start: 2024-06-10 | End: 2024-06-10

## 2024-06-10 RX ORDER — TAMSULOSIN HYDROCHLORIDE 0.4 MG/1
0.4 CAPSULE ORAL DAILY
Qty: 30 CAPSULE | Refills: 0 | Status: SHIPPED | OUTPATIENT
Start: 2024-06-10 | End: 2024-06-10

## 2024-06-10 RX ORDER — TAMSULOSIN HYDROCHLORIDE 0.4 MG/1
CAPSULE ORAL
Qty: 30 CAPSULE | Refills: 0 | Status: SHIPPED | OUTPATIENT
Start: 2024-06-10

## 2024-06-10 RX ORDER — KETOROLAC TROMETHAMINE 10 MG/1
10 TABLET, FILM COATED ORAL EVERY 6 HOURS
Qty: 20 TABLET | Refills: 0 | Status: SHIPPED | OUTPATIENT
Start: 2024-06-10 | End: 2024-06-15

## 2024-06-10 RX ORDER — ONDANSETRON HYDROCHLORIDE 2 MG/ML
4 INJECTION, SOLUTION INTRAVENOUS
Status: COMPLETED | OUTPATIENT
Start: 2024-06-10 | End: 2024-06-10

## 2024-06-10 RX ORDER — PROMETHAZINE HYDROCHLORIDE 25 MG/1
25 TABLET ORAL EVERY 6 HOURS PRN
Qty: 15 TABLET | Refills: 0 | Status: SHIPPED | OUTPATIENT
Start: 2024-06-10

## 2024-06-10 RX ORDER — ONDANSETRON HYDROCHLORIDE 2 MG/ML
4 INJECTION, SOLUTION INTRAVENOUS
Status: DISCONTINUED | OUTPATIENT
Start: 2024-06-10 | End: 2024-06-10

## 2024-06-10 RX ORDER — KETOROLAC TROMETHAMINE 30 MG/ML
15 INJECTION, SOLUTION INTRAMUSCULAR; INTRAVENOUS
Status: COMPLETED | OUTPATIENT
Start: 2024-06-10 | End: 2024-06-10

## 2024-06-10 RX ORDER — KETOROLAC TROMETHAMINE 10 MG/1
10 TABLET, FILM COATED ORAL EVERY 6 HOURS
Qty: 20 TABLET | Refills: 0 | Status: SHIPPED | OUTPATIENT
Start: 2024-06-10 | End: 2024-06-10

## 2024-06-10 RX ORDER — HYDROCODONE BITARTRATE AND ACETAMINOPHEN 7.5; 325 MG/1; MG/1
1 TABLET ORAL EVERY 6 HOURS PRN
Qty: 12 TABLET | Refills: 0 | Status: SHIPPED | OUTPATIENT
Start: 2024-06-10

## 2024-06-10 RX ORDER — PROMETHAZINE HYDROCHLORIDE 25 MG/1
25 TABLET ORAL EVERY 6 HOURS PRN
Qty: 15 TABLET | Refills: 0 | Status: SHIPPED | OUTPATIENT
Start: 2024-06-10 | End: 2024-06-10

## 2024-06-10 RX ADMIN — ONDANSETRON 4 MG: 2 INJECTION INTRAMUSCULAR; INTRAVENOUS at 12:06

## 2024-06-10 RX ADMIN — ORPHENADRINE CITRATE 60 MG: 30 INJECTION, SOLUTION INTRAMUSCULAR; INTRAVENOUS at 01:06

## 2024-06-10 RX ADMIN — KETOROLAC TROMETHAMINE 15 MG: 30 INJECTION, SOLUTION INTRAMUSCULAR at 01:06

## 2024-06-10 RX ADMIN — SODIUM CHLORIDE 1000 ML: 9 INJECTION, SOLUTION INTRAVENOUS at 12:06

## 2024-06-10 NOTE — DISCHARGE INSTRUCTIONS
- Take medication as directed by the label on the bottle.  - Follow up with pcp as needed if symptoms continue.  - Strain urine with strainer provided.  -The examination and treatment you have received in the Emergency Department today have been rendered on an emergency basis only and are not intended to be a substitute for an effort to provide complete medical care. You should contact your follow-up physician as it is important that you let him or her check you and report any new or remaining problems since it is impossible to recognize and treat all elements of an injury or illness in a single emergency care center visit.

## 2024-06-10 NOTE — ED PROVIDER NOTES
Encounter Date: 6/10/2024       History     Chief Complaint   Patient presents with    Vomiting    Nausea     Patient is a 60 y/o  male with a PMHx of GERD, Vertigo, and HLD presents to the ED POV with c/o nausea and vomiting. Patient stated that his current symptoms started at 0630 am, patient endorses TNTC nausea and vomiting. Patient also endorses L flank pain as well that he rates as a 8 on a sclae form 0 to 10.     The history is provided by the patient.   Emesis   This is a new problem. The current episode started today. The problem occurs 5 - 10 times per day. The problem has been resolved. The emesis has an appearance of stomach contents. Associated symptoms include diarrhea. Pertinent negatives include no abdominal pain, no arthralgias, no chills, no cough, no fever, no headaches, no myalgias, no sweats and no URI.   Nausea  This is a new problem. The current episode started 3 to 5 hours ago. The problem occurs constantly. The problem has been resolved. Pertinent negatives include no abdominal pain and no headaches. Nothing aggravates the symptoms. Nothing relieves the symptoms. He has tried nothing for the symptoms.     Review of patient's allergies indicates:  No Known Allergies  Past Medical History:   Diagnosis Date    Allergic rhinitis     Carpal tunnel syndrome     Epidemic vertigo     GERD (gastroesophageal reflux disease)     Hyperlipidemia     Internal hemorrhoids     Long term current use of therapeutic drug     Peptic ulcer     Plantar fascial fibromatosis     Sleep apnea      Past Surgical History:   Procedure Laterality Date    COLONOSCOPY  03/19/2013    FOOT SURGERY Right     KNEE SURGERY Left     SHOULDER SURGERY Right      No family history on file.  Social History     Tobacco Use    Smoking status: Former    Smokeless tobacco: Never   Substance Use Topics    Alcohol use: Yes     Comment: social    Drug use: Never     Review of Systems   Constitutional: Negative.  Negative for  chills and fever.   HENT: Negative.     Eyes: Negative.    Respiratory: Negative.  Negative for cough.    Cardiovascular: Negative.    Gastrointestinal:  Positive for diarrhea, nausea and vomiting. Negative for abdominal pain.   Endocrine: Negative.    Genitourinary: Negative.    Musculoskeletal: Negative.  Negative for arthralgias and myalgias.   Skin: Negative.    Allergic/Immunologic: Negative.    Neurological: Negative.  Negative for headaches.   Hematological: Negative.    Psychiatric/Behavioral: Negative.         Physical Exam     Initial Vitals   BP Pulse Resp Temp SpO2   06/10/24 1229 06/10/24 1229 06/10/24 1229 06/10/24 1300 06/10/24 1229   113/75 106 18 99 °F (37.2 °C) (!) 94 %      MAP       --                Physical Exam    Nursing note and vitals reviewed.  Constitutional: Vital signs are normal. He appears well-developed and well-nourished. He is not diaphoretic. He is cooperative.  Non-toxic appearance. He does not have a sickly appearance. He appears ill. No distress.   Cardiovascular:  Normal rate, regular rhythm, S1 normal, S2 normal, normal heart sounds, intact distal pulses and normal pulses.     Exam reveals no gallop, no S3, no S4, no distant heart sounds and no friction rub.       No murmur heard.  No systolic murmur is present.  No diastolic murmur is present.  Pulmonary/Chest: Effort normal and breath sounds normal.   Abdominal: Abdomen is soft and flat. Bowel sounds are normal. There is no abdominal tenderness. No hernia.   Musculoskeletal:        Back:      Lymphadenopathy:     He has no cervical adenopathy.     He has no axillary adenopathy.   Neurological: He is alert and oriented to person, place, and time. He has normal strength and normal reflexes. He displays normal reflexes. No cranial nerve deficit or sensory deficit. He displays a negative Romberg sign. GCS eye subscore is 4. GCS verbal subscore is 5. GCS motor subscore is 6.   Skin: Skin is warm, dry and intact. Capillary refill  takes less than 2 seconds. No rash noted.   Psychiatric: He has a normal mood and affect. His speech is normal and behavior is normal. Judgment and thought content normal. Cognition and memory are normal.         Medical Screening Exam   See Full Note    ED Course   Procedures  Labs Reviewed   COMPREHENSIVE METABOLIC PANEL - Abnormal; Notable for the following components:       Result Value    Glucose 147 (*)     Total Protein 9.0 (*)     Globulin 4.5 (*)     AST 48 (*)     All other components within normal limits   CBC WITH DIFFERENTIAL - Abnormal; Notable for the following components:    MCHC 31.9 (*)     RDW 14.8 (*)     Neutrophils % 94.5 (*)     Lymphocytes % 1.6 (*)     Lymphocytes, Absolute 0.12 (*)     Eosinophils % 0.5 (*)     All other components within normal limits   URINALYSIS, REFLEX TO URINE CULTURE - Abnormal; Notable for the following components:    Protein,  (*)     Bilirubin, UA Small (*)     Specific Gravity, UA >=1.030 (*)     All other components within normal limits   MANUAL DIFFERENTIAL - Abnormal; Notable for the following components:    Segmented Neutrophils, Man % 84 (*)     Bands, Man % 10 (*)     Lymphocytes, Man % 3 (*)     All other components within normal limits   URINALYSIS, MICROSCOPIC - Abnormal; Notable for the following components:    Squamous Epithelial Cells, UA Few (*)     Mucus, UA Many (*)     Amorphous Crystals, UA Many (*)     All other components within normal limits   INFLUENZA A & B BY MOLECULAR - Normal   LIPASE - Normal   SARS-COV-2 RNA AMPLIFICATION, QUAL - Normal    Narrative:     Negative SARS-CoV results should not be used as the sole basis for treatment or patient management decisions; negative results should be considered in the context of a patient's recent exposures, history and the presene of clinical signs and symptoms consistent with COVID-19.  Negative results should be treated as presumptive and confirmed by molecular assay, if necessary for  patient management.   CBC W/ AUTO DIFFERENTIAL    Narrative:     The following orders were created for panel order CBC auto differential.  Procedure                               Abnormality         Status                     ---------                               -----------         ------                     CBC with Differential[[id number]]        Abnormal            Final result               Manual Differential[[id number]]          Abnormal            Final result                 Please view results for these tests on the individual orders.          Imaging Results              CT Abdomen Pelvis  Without Contrast (Final result)  Result time 06/10/24 13:58:52      Final result by Christian Ellis II, MD (06/10/24 13:58:52)                   Impression:      Nonobstructing left renal calculus.  No other calculi or other abnormality.      Electronically signed by: Christian Ellis  Date:    06/10/2024  Time:    13:58               Narrative:    EXAMINATION:  CT ABDOMEN PELVIS WITHOUT CONTRAST    CLINICAL HISTORY:  Flank pain, kidney stone suspected;    TECHNIQUE:  Axial CT imaging of the abdomen and pelvis is performed without contrast.    CT dose reduction technique used - Dose Rite and tube current modulation.    COMPARISON:  None available    FINDINGS:  Cardiac and lung bases are within normal limits    CT abdomen: The liver, spleen, pancreas and adrenal glands are normal in size and density.  No evidence of focal lesion is demonstrated in these solid organs.    Kidneys are normal in size and density.  Nonobstructing calculus left kidney estimated 5 mm.  No evidence of hydronephrosis or ureterolithiasis is seen.    The bowel caliber is normal and no wall thickening or adjacent inflammatory change is seen.  No evidence of free fluid or free air is present.  Appendix is normal.    CT pelvis: The bowel and bladder appear within normal limits.  The pelvic organs show no evidence of abnormality                                     X-Rays:   Independently Interpreted Readings:   Other Readings:  Reading Physician Reading Date Result Priority  Christian Ellis II, MD  [phone] 6/10/2024 STAT    Narrative & Impression  EXAMINATION:  CT ABDOMEN PELVIS WITHOUT CONTRAST     CLINICAL HISTORY:  Flank pain, kidney stone suspected;     TECHNIQUE:  Axial CT imaging of the abdomen and pelvis is performed without contrast.     CT dose reduction technique used - Dose Rite and tube current modulation.     COMPARISON:  None available     FINDINGS:  Cardiac and lung bases are within normal limits     CT abdomen: The liver, spleen, pancreas and adrenal glands are normal in size and density.  No evidence of focal lesion is demonstrated in these solid organs.     Kidneys are normal in size and density.  Nonobstructing calculus left kidney estimated 5 mm.  No evidence of hydronephrosis or ureterolithiasis is seen.     The bowel caliber is normal and no wall thickening or adjacent inflammatory change is seen.  No evidence of free fluid or free air is present.  Appendix is normal.     CT pelvis: The bowel and bladder appear within normal limits.  The pelvic organs show no evidence of abnormality     Impression:     Nonobstructing left renal calculus.  No other calculi or other abnormality.        Electronically signed by:Christian Ellis  Date:                                            06/10/2024  Time:                                           13:58      Medications   ondansetron injection 4 mg (4 mg Intravenous Given 6/10/24 1224)   sodium chloride 0.9% bolus 1,000 mL 1,000 mL (1,000 mLs Intravenous New Bag 6/10/24 1249)   ketorolac injection 15 mg (15 mg Intravenous Given 6/10/24 1334)   orphenadrine injection 60 mg (60 mg Intramuscular Given 6/10/24 1334)     Medical Decision Making  Given the large differential diagnosis for Mitch Dinh, the decision making in this case is of high complexity.    After evaluating all of the data points  in this case, the presentation of Alan Dinh is NOT consistent with an infected stone, nephric abscess, sepsis, or renal failure.    Similarly, this presentation is not consistent with a AAA; Mesenteric Ischemia; Bowel Perforation; Bowel Obstruction; Sigmoid Volvulus; Diverticulitis; Appendicitis; Peritonitis; Cholecystitis, ascending cholangitis or other gallbladder disease; perforated ulcer; significant GI bleeding, splenic rupture/infarction; Hepatic abscess; GI bleeding, or other surgical/acute abdomen.    Similarly, this presentation is NOT consistent with ACS or Myocardial Ischemia; Pulmonary Embolism; fistula; incarcerated hernia; Pancreatitis, Aortic Dissection; Diabetic Ketoacidosis; Ischemic colitis; Psoas or other abscess; Methanol poisoning; Heavy metal toxicity; or porphyria.    Similarly, this case is NOT consistent with testicular torsion, prostatitis, hernia, STI, or other testicular issue.    Similarly, this presentation is NOT consistent with acute coronary syndrome, pulmonary embolism, dissection, borhaave's, arrythmia, pneumothorax, cardiac tamponade, or other emergent cardiopulmonary condition.    Similarly, this presentation is NOT consistent with sepsis, pyelonephritis, urinary infection, pneumonia, or other focal bacterial infection.    Strict return and follow-up precautions have been given by me personally to the patient/family/caregiver(s).    Data Reviewed/Counseling: I have reviwed the patient's vital signs, nursing notes, and other relevant tests/information. I had a detailed discussion regarding the historical points, exam findings, and any diagnostic results supporting the discharge diagnosis. I also discussed the need for outpatient follow-up and the need to return to the ED if symptoms worsen or if there are any questions or concerns that arise at home.      Amount and/or Complexity of Data Reviewed  Labs: ordered. Decision-making details documented in ED Course.  Radiology:  ordered.    Risk  Prescription drug management.               ED Course as of 06/10/24 1420   Mon Jarek 10, 2024   1324 Lymph %(!): 3 [AC]   1324 Bands(!): 10 [AC]   1324 Segmented Neutrophils, Man %(!): 84 [AC]   1324 Eos %(!): 0.5 [AC]   1324 Lymph #(!): 0.12 [AC]   1324 Lymph %(!): 1.6 [AC]   1324 Neutrophils Relative(!): 94.5 [AC]   1324 RDW(!): 14.8 [AC]   1324 MCHC(!): 31.9 [AC]   1327 Amorphous, UA(!): Many [AC]   1328 Mucus, UA(!): Many [AC]   1328 Squam Epithel, UA(!): Few [AC]   1328 Protein, UA(!): 100 [AC]   1328 Bilirubin (UA)(!): Small [AC]   1328 Spec Grav UA(!): >=1.030 [AC]   1413 Lab and CT results reviewed by me and discussed with patient.Discharge instructions given along with strict return precautions, patient verbalizes understanding.   [AC]      ED Course User Index  [AC] Kody Huston FNP                           Clinical Impression:   Final diagnoses:  [R11.2] Nausea and vomiting, unspecified vomiting type (Primary)  [N20.0] Kidney stone        ED Disposition Condition    Discharge Stable          ED Prescriptions       Medication Sig Dispense Start Date End Date Auth. Provider    tamsulosin (FLOMAX) 0.4 mg Cap Take 1 capsule (0.4 mg total) by mouth once daily. 30 capsule 6/10/2024 7/10/2024 Kody Huston FNP    ketorolac (TORADOL) 10 mg tablet Take 1 tablet (10 mg total) by mouth every 6 (six) hours. for 5 days 20 tablet 6/10/2024 6/15/2024 Kody Huston FNP    promethazine (PHENERGAN) 25 MG tablet Take 1 tablet (25 mg total) by mouth every 6 (six) hours as needed for Nausea. 15 tablet 6/10/2024 -- Kody Huston FNP    HYDROcodone-acetaminophen (NORCO) 7.5-325 mg per tablet Take 1 tablet by mouth every 6 (six) hours as needed for Pain. 12 tablet 6/10/2024 -- Kody Huston FNP          Follow-up Information       Follow up With Specialties Details Why Contact Info    Yoselin Lazar MD Family Medicine  As needed, If symptoms worsen [address] [facility]  [facility] [id number]  [phone]               Kody Huston, PRIYA  06/10/24 1422

## 2024-06-10 NOTE — ED TRIAGE NOTES
Patient arrived to the ER c/o of Vomiting/ diarrhea that started the morning. Patient stated he has went to the bathroom multiple times. PT stated pain 10/10 on pain scale to the left upper abdomen.

## 2025-05-30 DIAGNOSIS — L30.1 DYSHIDROTIC ECZEMA: Primary | ICD-10-CM
